# Patient Record
Sex: MALE | Race: WHITE | Employment: UNEMPLOYED | URBAN - METROPOLITAN AREA
[De-identification: names, ages, dates, MRNs, and addresses within clinical notes are randomized per-mention and may not be internally consistent; named-entity substitution may affect disease eponyms.]

---

## 2017-05-27 ENCOUNTER — APPOINTMENT (OUTPATIENT)
Dept: GENERAL RADIOLOGY | Age: 37
End: 2017-05-27
Attending: PHYSICIAN ASSISTANT
Payer: SELF-PAY

## 2017-05-27 ENCOUNTER — HOSPITAL ENCOUNTER (EMERGENCY)
Age: 37
Discharge: HOME | End: 2017-05-27
Attending: EMERGENCY MEDICINE
Payer: SELF-PAY

## 2017-05-27 VITALS
HEART RATE: 96 BPM | OXYGEN SATURATION: 100 % | BODY MASS INDEX: 33.6 KG/M2 | DIASTOLIC BLOOD PRESSURE: 110 MMHG | TEMPERATURE: 98.9 F | WEIGHT: 240 LBS | RESPIRATION RATE: 18 BRPM | SYSTOLIC BLOOD PRESSURE: 160 MMHG | HEIGHT: 71 IN

## 2017-05-27 DIAGNOSIS — S63.92XA HAND SPRAIN, LEFT, INITIAL ENCOUNTER: Primary | ICD-10-CM

## 2017-05-27 DIAGNOSIS — R03.0 ELEVATED BLOOD PRESSURE READING: ICD-10-CM

## 2017-05-27 PROCEDURE — 74011250636 HC RX REV CODE- 250/636: Performed by: PHYSICIAN ASSISTANT

## 2017-05-27 PROCEDURE — 73130 X-RAY EXAM OF HAND: CPT

## 2017-05-27 PROCEDURE — 99284 EMERGENCY DEPT VISIT MOD MDM: CPT

## 2017-05-27 PROCEDURE — 74011250637 HC RX REV CODE- 250/637: Performed by: EMERGENCY MEDICINE

## 2017-05-27 PROCEDURE — 96372 THER/PROPH/DIAG INJ SC/IM: CPT

## 2017-05-27 PROCEDURE — 73110 X-RAY EXAM OF WRIST: CPT

## 2017-05-27 RX ORDER — LISINOPRIL 5 MG/1
5 TABLET ORAL
Status: COMPLETED | OUTPATIENT
Start: 2017-05-27 | End: 2017-05-27

## 2017-05-27 RX ORDER — KETOROLAC TROMETHAMINE 30 MG/ML
60 INJECTION, SOLUTION INTRAMUSCULAR; INTRAVENOUS
Status: COMPLETED | OUTPATIENT
Start: 2017-05-27 | End: 2017-05-27

## 2017-05-27 RX ORDER — LISINOPRIL 5 MG/1
5 TABLET ORAL DAILY
Qty: 30 TAB | Refills: 0 | Status: SHIPPED | OUTPATIENT
Start: 2017-05-27 | End: 2017-07-09

## 2017-05-27 RX ORDER — NAPROXEN 500 MG/1
500 TABLET ORAL 2 TIMES DAILY WITH MEALS
Qty: 20 TAB | Refills: 0 | Status: SHIPPED | OUTPATIENT
Start: 2017-05-27 | End: 2017-06-06

## 2017-05-27 RX ORDER — LISINOPRIL 10 MG/1
5 TABLET ORAL
Status: DISCONTINUED | OUTPATIENT
Start: 2017-05-27 | End: 2017-05-27

## 2017-05-27 RX ADMIN — LISINOPRIL 5 MG: 5 TABLET ORAL at 09:20

## 2017-05-27 RX ADMIN — KETOROLAC TROMETHAMINE 60 MG: 30 INJECTION, SOLUTION INTRAMUSCULAR at 08:16

## 2017-05-27 NOTE — ED TRIAGE NOTES
Patient states he was changing his tire when the pratik slipped and hit his hand. Noticeable swelling to left hand.

## 2017-05-27 NOTE — DISCHARGE INSTRUCTIONS
Elevated Blood Pressure: Care Instructions  Your Care Instructions    Blood pressure is a measure of how hard the blood pushes against the walls of your arteries. It's normal for blood pressure to go up and down throughout the day. But if it stays up over time, you have high blood pressure. Two numbers tell you your blood pressure. The first number is the systolic pressure. It shows how hard the blood pushes when your heart is pumping. The second number is the diastolic pressure. It shows how hard the blood pushes between heartbeats, when your heart is relaxed and filling with blood. An ideal blood pressure in adults is less than 120/80 (say \"120 over 80\"). High blood pressure is 140/90 or higher. You have high blood pressure if your top number is 140 or higher or your bottom number is 90 or higher, or both. The main test for high blood pressure is simple, fast, and painless. To diagnose high blood pressure, your doctor will test your blood pressure at different times. After testing your blood pressure, your doctor may ask you to test it again when you are home. If you are diagnosed with high blood pressure, you can work with your doctor to make a long-term plan to manage it. Follow-up care is a key part of your treatment and safety. Be sure to make and go to all appointments, and call your doctor if you are having problems. It's also a good idea to know your test results and keep a list of the medicines you take. How can you care for yourself at home? · Do not smoke. Smoking increases your risk for heart attack and stroke. If you need help quitting, talk to your doctor about stop-smoking programs and medicines. These can increase your chances of quitting for good. · Stay at a healthy weight. · Try to limit how much sodium you eat to less than 2,300 milligrams (mg) a day. Your doctor may ask you to try to eat less than 1,500 mg a day. · Be physically active.  Get at least 30 minutes of exercise on most days of the week. Walking is a good choice. You also may want to do other activities, such as running, swimming, cycling, or playing tennis or team sports. · Avoid or limit alcohol. Talk to your doctor about whether you can drink any alcohol. · Eat plenty of fruits, vegetables, and low-fat dairy products. Eat less saturated and total fats. · Learn how to check your blood pressure at home. When should you call for help? Call your doctor now or seek immediate medical care if:  · Your blood pressure is much higher than normal (such as 180/110 or higher). · You think high blood pressure is causing symptoms such as:  ¨ Severe headache. ¨ Blurry vision. Watch closely for changes in your health, and be sure to contact your doctor if:  · You do not get better as expected. Where can you learn more? Go to http://annabellaCOLOURloverscuate.info/. Enter F306 in the search box to learn more about \"Elevated Blood Pressure: Care Instructions. \"  Current as of: October 19, 2016  Content Version: 11.2  © 8817-3255 [employer]. Care instructions adapted under license by [employer] (which disclaims liability or warranty for this information). If you have questions about a medical condition or this instruction, always ask your healthcare professional. [address] any warranty or liability for your use of this information. Hand Sprain: Care Instructions  Your Care Instructions  A hand sprain occurs when you stretch or tear a ligament in your hand. Ligaments are the tough tissues that connect one bone to another. Most hand sprains will heal with treatment you can do at home. Follow-up care is a key part of your treatment and safety. Be sure to make and go to all appointments, and call your doctor if you are having problems. It's also a good idea to know your test results and keep a list of the medicines you take. How can you care for yourself at home?   · If your doctor gave you a splint or immobilizer, wear it as directed. This will help keep swelling down and help your hand heal.  · Follow your doctor's directions for exercise and other activity. · For the first 2 days after your injury, avoid things that might increase swelling, such as hot showers, hot tubs, or hot packs. · Put ice or a cold pack on your hand for 10 to 20 minutes at a time to stop swelling. Try this every 1 to 2 hours for 3 days (when you are awake) or until the swelling goes down. Put a thin cloth between the ice pack and your skin. Keep your splint dry. · After 2 or 3 days, if your swelling is gone, put a heating pad (set on low) or a warm cloth on your hand. Some experts suggest that you go back and forth between hot and cold treatments. · Prop up your hand on a pillow when you ice it or anytime you sit or lie down. Try to keep it above the level of your heart. This will help reduce swelling. · Take pain medicines exactly as directed. ¨ If the doctor gave you a prescription medicine for pain, take it as prescribed. ¨ If you are not taking a prescription pain medicine, ask your doctor if you can take an over-the-counter medicine. · Return to your usual level of activity slowly. When should you call for help? Call your doctor now or seek immediate medical care if:  · Your pain is worse. · You have new or increased swelling in your hand. · You cannot move your hand. · You have tingling, weakness, or numbness in your hand or fingers. · Your hand or fingers are cool or pale or change color. · You have a fever. · Your hand or fingers are red. Watch closely for changes in your health, and be sure to contact your doctor if:  · Your hand does not get better as expected. Where can you learn more? Go to http://annabella-cuate.info/. Enter Y866 in the search box to learn more about \"Hand Sprain: Care Instructions. \"  Current as of: May 23, 2016  Content Version: 11.2  © 8063-1129 [employer]. Care instructions adapted under license by [employer] (which disclaims liability or warranty for this information). If you have questions about a medical condition or this instruction, always ask your healthcare professional. [place] [address] any warranty or liability for your use of this information.

## 2017-05-27 NOTE — ED PROVIDER NOTES
HPI Comments: Miguel Angel Gonzalez is a 43 y.o. Male smoker with a pertinent history of diverticulitis, HTN not currently on meds, HA, Allergic rhinitis, substance abuse, GERD, depression who presents to the emergency department for evaluation of left hand and wrist pain with swelling after an injury which occurred this morning. Pt states he was changing a tire on a pratik when the pratik popped out hit his hand. He took motrin pta. He states he is still able to move the fingers and hand, but is experiencing a burning and tearing sensation in his hand, particularly when moving the 2nd and 3rd fingers. Pt has not been on BP meds in a long time due to lack of PCP and insurance, but remembers having been on lisinopril 5mg. Pt denies any fevers or chills, headache, dizziness or light headedness, ENT issues, CP or discomfort, SOB, cough, n/v/d/c, abd pain, back pain, diaphoresis, melena/hematochezia, dysuria, hematuria, frequency, focal weakness/numbness/tingling, or rash. Patient has no other complaints at this time. PCP:  None        Patient is a 43 y.o. male presenting with hand pain, wrist pain, and hand swelling. Hand Pain    Pertinent negatives include no back pain. Wrist Pain    Pertinent negatives include no back pain. Hand Swelling    Pertinent negatives include no back pain.         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    Substance abuse     Opiate and ETOH Abuse    Tobacco dependence        Past Surgical History:   Procedure Laterality Date    HX HERNIA REPAIR      HX ORTHOPAEDIC      L wrist    HX ORTHOPAEDIC      pins in right hand         Family History:   Problem Relation Age of Onset    Adopted: Yes       Social History     Social History    Marital status: SINGLE     Spouse name: N/A    Number of children: N/A    Years of education: HS     Occupational History    Unemployed Not Employed     Social History Main Topics    Smoking status: Current Every Day Smoker     Packs/day: 1.50     Years: 20.00    Smokeless tobacco: Not on file    Alcohol use Yes      Comment: QD  HEAVY USE    Drug use: 5.00 per week     Special: Opiates, Marijuana    Sexual activity: Yes     Partners: Female     Birth control/ protection: None     Other Topics Concern    Not on file     Social History Narrative    Never . Lives in a camper on his parent's property. Unemployed. ALLERGIES: Phenergan [promethazine]; Risperidone; and Zyprexa [olanzapine]    Review of Systems   Constitutional: Negative for chills and fever. HENT: Negative for congestion, rhinorrhea and sore throat. Respiratory: Negative for cough and shortness of breath. Cardiovascular: Negative for chest pain. Gastrointestinal: Negative for abdominal pain, blood in stool, constipation, diarrhea, nausea and vomiting. Genitourinary: Negative for dysuria, frequency and hematuria. Musculoskeletal: Positive for arthralgias and joint swelling. Negative for back pain and myalgias. Skin: Negative for rash and wound. Neurological: Negative for dizziness and headaches. Vitals:    05/27/17 0733 05/27/17 0744 05/27/17 0747   BP: (!) 180/114 (!) 167/114    Pulse: 94 96    Resp: 16 18    Temp: 98.9 °F (37.2 °C) 98.7 °F (37.1 °C)    SpO2: 98% 100% 100%   Weight: 108.9 kg (240 lb)     Height: 5' 11\" (1.803 m)              Physical Exam   Constitutional: He is oriented to person, place, and time. He appears well-developed and well-nourished. No distress. HENT:   Head: Normocephalic and atraumatic. Eyes: Conjunctivae are normal.   Neck: Normal range of motion. Neck supple. Cardiovascular: Normal rate, regular rhythm and normal heart sounds. Pulmonary/Chest: Effort normal and breath sounds normal. No respiratory distress. He exhibits no tenderness. Abdominal: Soft. Bowel sounds are normal. He exhibits no distension. There is no tenderness. There is no rebound and no guarding. Musculoskeletal: He exhibits edema and tenderness. He exhibits no deformity. TTP noted to left dorsal hand with minimal erythema and edema. No obvious deformity, ecchymosis, or overlying skin changes noted on exam.  Pt is moving fingers of left hand with difficulty 2/2 to pain. Pt is neurovascularly intact distally with cap refill < 3 seconds and intact sensation. Neurological: He is alert and oriented to person, place, and time. Skin: Skin is warm and dry. He is not diaphoretic. Psychiatric: He has a normal mood and affect. Nursing note and vitals reviewed. MDM  Number of Diagnoses or Management Options  Elevated blood pressure reading: new and requires workup  Hand sprain, left, initial encounter: new and requires workup  Diagnosis management comments: Differential Diagnosis: fracture, dislocation, tendinous/ligamentous tear/strain, contusion, OA, RA, gout    Plan:  Pt presents ambulatory in , well-hydrated, non-toxic in appearance, with elevated BP and otherwise normal vitals. Exam reveals TTP with minimal edema and erythema to dorsal left hand. XR shows no acute process, pending radiology read. Will DC home with naprosyn, RICE, ace wrap. At this time, patient is stable and appropriate for discharge home. Patient demonstrates understanding of current diagnoses and is in agreement with the treatment plan. They are advised that while the likelihood of serious underlying condition is low at this point given the evaluation performed today, we cannot fully rule it out. They are advised to immediately return with any new symptoms or worsening of current condition. All questions have been answered. Patient is given educational material regarding their diagnoses, including danger symptoms and when to return to the ED.   Pt strongly urged to follow-up with Ortho. Amount and/or Complexity of Data Reviewed  Tests in the radiology section of CPT®: ordered and reviewed  Review and summarize past medical records: yes    Risk of Complications, Morbidity, and/or Mortality  Presenting problems: moderate  Diagnostic procedures: moderate  Management options: moderate    Patient Progress  Patient progress: improved    ED Course       Procedures      -------------------------------------------------------------------------------------------------------------------  Orders:  Orders Placed This Encounter    XR HAND LT MIN 3 V     Standing Status:   Standing     Number of Occurrences:   1     Order Specific Question:   Transport     Answer:   Stretcher [5]     Order Specific Question:   Reason for Exam     Answer:   Pain    XR WRIST LT AP/LAT/OBL MIN 3V     Standing Status:   Standing     Number of Occurrences:   1     Order Specific Question:   Transport     Answer:   Stretcher [5]     Order Specific Question:   Reason for Exam     Answer:   Pain    APPLY ACE WRAP:SPECIFY ONE TIME STAT     Standing Status:   Standing     Number of Occurrences:   1    ketorolac tromethamine (TORADOL) 60 mg/2 mL injection 60 mg    DISCONTD: lisinopril (PRINIVIL, ZESTRIL) tablet 5 mg    lisinopril (PRINIVIL, ZESTRIL) tablet 5 mg    naproxen (NAPROSYN) 500 mg tablet     Sig: Take 1 Tab by mouth two (2) times daily (with meals) for 10 days. Dispense:  20 Tab     Refill:  0    lisinopril (PRINIVIL, ZESTRIL) 5 mg tablet     Sig: Take 1 Tab by mouth daily.      Dispense:  30 Tab     Refill:  0      Radiology Results:  XR HAND LT MIN 3 V   Final Result      XR WRIST LT AP/LAT/OBL MIN 3V   Final Result          Progress Notes:  8:19 AM:  Marie Santoro PA-C was at the pt's bedside, assessed the pt and answered the pt's questions regarding treatment.    -------------------------------------------------------------------------------------------------------------------    Disposition:  Diagnosis:   1. Hand sprain, left, initial encounter    2. Elevated blood pressure reading        Disposition: [place] Home    Follow-up Information     Follow up With Details Comments Contact Info    Sloan Laboy MD Call in 2 days For follow-up [address]  [phone]      [facility] EMERGENCY DEPT Go to As needed, If symptoms worsen [address]  [phone]          Patient's Medications   Start Taking    LISINOPRIL (PRINIVIL, ZESTRIL) 5 MG TABLET    Take 1 Tab by mouth daily. NAPROXEN (NAPROSYN) 500 MG TABLET    Take 1 Tab by mouth two (2) times daily (with meals) for 10 days. Continue Taking    PREDNISONE (DELTASONE) 20 MG TABLET    3 tabs daily for 2 days; then 2 tabs daily for 2 days;    These Medications have changed    No medications on file   Stop Taking    No medications on file

## 2017-05-27 NOTE — ED TRIAGE NOTES
Pt, c/o pain /swelling left hand & wrist,  States car pratik flip  & hit his hand  While changing tire

## 2020-02-04 PROBLEM — E87.20 LACTIC ACIDOSIS: Status: ACTIVE | Noted: 2020-02-04

## 2020-02-04 PROBLEM — R00.0 TACHYCARDIA: Status: ACTIVE | Noted: 2020-02-04

## 2020-02-04 PROBLEM — F10.939 ALCOHOL WITHDRAWAL (HCC): Status: ACTIVE | Noted: 2020-02-04

## 2020-02-04 PROBLEM — R11.2 NAUSEA & VOMITING: Status: ACTIVE | Noted: 2020-02-04

## 2020-03-05 PROBLEM — E87.6 HYPOKALEMIA: Status: ACTIVE | Noted: 2020-03-05

## 2020-03-05 PROBLEM — K85.20 ACUTE ALCOHOLIC PANCREATITIS: Status: ACTIVE | Noted: 2020-03-05

## 2020-03-23 PROBLEM — Z95.5 S/P CORONARY ARTERY STENT PLACEMENT: Status: ACTIVE | Noted: 2020-03-20

## 2020-03-23 PROBLEM — Z95.5 S/P CORONARY ARTERY STENT PLACEMENT: Status: ACTIVE | Noted: 2020-03-23

## 2020-03-23 PROBLEM — R07.2 PRECORDIAL CHEST PAIN: Status: ACTIVE | Noted: 2020-03-23

## 2020-06-13 PROBLEM — K52.9 ENTERITIS: Status: ACTIVE | Noted: 2020-06-13

## 2020-07-14 ENCOUNTER — APPOINTMENT (OUTPATIENT)
Dept: GENERAL RADIOLOGY | Age: 40
End: 2020-07-14
Attending: EMERGENCY MEDICINE
Payer: MEDICAID

## 2020-07-14 ENCOUNTER — HOSPITAL ENCOUNTER (EMERGENCY)
Age: 40
Discharge: HOME | End: 2020-07-15
Attending: EMERGENCY MEDICINE
Payer: MEDICAID

## 2020-07-14 ENCOUNTER — HOSPITAL ENCOUNTER (EMERGENCY)
Age: 40
Discharge: HOME | End: 2020-07-14
Attending: EMERGENCY MEDICINE

## 2020-07-14 VITALS
OXYGEN SATURATION: 97 % | RESPIRATION RATE: 16 BRPM | SYSTOLIC BLOOD PRESSURE: 129 MMHG | DIASTOLIC BLOOD PRESSURE: 83 MMHG | BODY MASS INDEX: 25.9 KG/M2 | HEIGHT: 71 IN | HEART RATE: 101 BPM | TEMPERATURE: 98.4 F | WEIGHT: 185 LBS

## 2020-07-14 VITALS
DIASTOLIC BLOOD PRESSURE: 60 MMHG | HEART RATE: 81 BPM | TEMPERATURE: 97.5 F | RESPIRATION RATE: 16 BRPM | OXYGEN SATURATION: 94 % | SYSTOLIC BLOOD PRESSURE: 104 MMHG

## 2020-07-14 DIAGNOSIS — F10.930 ALCOHOL WITHDRAWAL SYNDROME WITHOUT COMPLICATION (HCC): Primary | ICD-10-CM

## 2020-07-14 DIAGNOSIS — K57.92 DIVERTICULITIS: ICD-10-CM

## 2020-07-14 DIAGNOSIS — R10.13 ABDOMINAL PAIN, EPIGASTRIC: ICD-10-CM

## 2020-07-14 DIAGNOSIS — K86.0 ALCOHOL-INDUCED CHRONIC PANCREATITIS (HCC): ICD-10-CM

## 2020-07-14 DIAGNOSIS — Z78.9 ALCOHOL USE: Primary | ICD-10-CM

## 2020-07-14 LAB
ALBUMIN SERPL-MCNC: 4.5 G/DL (ref 3.4–5)
ALBUMIN/GLOB SERPL: 1.3 {RATIO} (ref 0.8–1.7)
ALP SERPL-CCNC: 196 U/L (ref 45–117)
ALT SERPL-CCNC: 107 U/L (ref 16–61)
AMPHET UR QL SCN: NEGATIVE
ANION GAP SERPL CALC-SCNC: 12 MMOL/L (ref 3–18)
APPEARANCE UR: CLEAR
AST SERPL-CCNC: 73 U/L (ref 10–38)
ATRIAL RATE: 106 BPM
BARBITURATES UR QL SCN: POSITIVE
BASOPHILS # BLD: 0 K/UL (ref 0–0.1)
BASOPHILS NFR BLD: 0 % (ref 0–2)
BENZODIAZ UR QL: POSITIVE
BILIRUB SERPL-MCNC: 0.4 MG/DL (ref 0.2–1)
BILIRUB UR QL: NEGATIVE
BUN SERPL-MCNC: 6 MG/DL (ref 7–18)
BUN/CREAT SERPL: 9 (ref 12–20)
CALCIUM SERPL-MCNC: 8.8 MG/DL (ref 8.5–10.1)
CALCULATED P AXIS, ECG09: 49 DEGREES
CALCULATED R AXIS, ECG10: 8 DEGREES
CALCULATED T AXIS, ECG11: 52 DEGREES
CANNABINOIDS UR QL SCN: NEGATIVE
CHLORIDE SERPL-SCNC: 108 MMOL/L (ref 100–111)
CO2 SERPL-SCNC: 22 MMOL/L (ref 21–32)
COCAINE UR QL SCN: NEGATIVE
COLOR UR: YELLOW
COVID-19 RAPID TEST, COVR: NOT DETECTED
CREAT SERPL-MCNC: 0.64 MG/DL (ref 0.6–1.3)
DIAGNOSIS, 93000: NORMAL
DIFFERENTIAL METHOD BLD: ABNORMAL
EOSINOPHIL # BLD: 0.2 K/UL (ref 0–0.4)
EOSINOPHIL NFR BLD: 1 % (ref 0–5)
ERYTHROCYTE [DISTWIDTH] IN BLOOD BY AUTOMATED COUNT: 13.4 % (ref 11.6–14.5)
ETHANOL SERPL-MCNC: 108 MG/DL (ref 0–3)
GLOBULIN SER CALC-MCNC: 3.6 G/DL (ref 2–4)
GLUCOSE SERPL-MCNC: 66 MG/DL (ref 74–99)
GLUCOSE UR STRIP.AUTO-MCNC: NEGATIVE MG/DL
HCT VFR BLD AUTO: 49.2 % (ref 36–48)
HDSCOM,HDSCOM: ABNORMAL
HGB BLD-MCNC: 17.1 G/DL (ref 13–16)
HGB UR QL STRIP: NEGATIVE
KETONES UR QL STRIP.AUTO: ABNORMAL MG/DL
LEUKOCYTE ESTERASE UR QL STRIP.AUTO: NEGATIVE
LIPASE SERPL-CCNC: 73 U/L (ref 73–393)
LYMPHOCYTES # BLD: 2.7 K/UL (ref 0.9–3.6)
LYMPHOCYTES NFR BLD: 17 % (ref 21–52)
MAGNESIUM SERPL-MCNC: 1.9 MG/DL (ref 1.6–2.6)
MCH RBC QN AUTO: 29.5 PG (ref 24–34)
MCHC RBC AUTO-ENTMCNC: 34.8 G/DL (ref 31–37)
MCV RBC AUTO: 85 FL (ref 74–97)
METHADONE UR QL: NEGATIVE
MONOCYTES # BLD: 0.9 K/UL (ref 0.05–1.2)
MONOCYTES NFR BLD: 5 % (ref 3–10)
NEUTS SEG # BLD: 12.3 K/UL (ref 1.8–8)
NEUTS SEG NFR BLD: 77 % (ref 40–73)
NITRITE UR QL STRIP.AUTO: NEGATIVE
OPIATES UR QL: NEGATIVE
P-R INTERVAL, ECG05: 164 MS
PCP UR QL: NEGATIVE
PH UR STRIP: 5 [PH] (ref 5–8)
PLATELET # BLD AUTO: 274 K/UL (ref 135–420)
PMV BLD AUTO: 10.1 FL (ref 9.2–11.8)
POTASSIUM SERPL-SCNC: 3.7 MMOL/L (ref 3.5–5.5)
PROT SERPL-MCNC: 8.1 G/DL (ref 6.4–8.2)
PROT UR STRIP-MCNC: NEGATIVE MG/DL
Q-T INTERVAL, ECG07: 340 MS
QRS DURATION, ECG06: 86 MS
QTC CALCULATION (BEZET), ECG08: 451 MS
RBC # BLD AUTO: 5.79 M/UL (ref 4.7–5.5)
SODIUM SERPL-SCNC: 142 MMOL/L (ref 136–145)
SOURCE, COVRS: NORMAL
SP GR UR REFRACTOMETRY: 1.01 (ref 1–1.03)
UROBILINOGEN UR QL STRIP.AUTO: 0.2 EU/DL (ref 0.2–1)
VENTRICULAR RATE, ECG03: 106 BPM
WBC # BLD AUTO: 16.1 K/UL (ref 4.6–13.2)

## 2020-07-14 PROCEDURE — 74018 RADEX ABDOMEN 1 VIEW: CPT

## 2020-07-14 PROCEDURE — 74011250636 HC RX REV CODE- 250/636: Performed by: EMERGENCY MEDICINE

## 2020-07-14 PROCEDURE — 81003 URINALYSIS AUTO W/O SCOPE: CPT

## 2020-07-14 PROCEDURE — 83735 ASSAY OF MAGNESIUM: CPT

## 2020-07-14 PROCEDURE — 87635 SARS-COV-2 COVID-19 AMP PRB: CPT

## 2020-07-14 PROCEDURE — 71045 X-RAY EXAM CHEST 1 VIEW: CPT

## 2020-07-14 PROCEDURE — 93005 ELECTROCARDIOGRAM TRACING: CPT

## 2020-07-14 PROCEDURE — 99285 EMERGENCY DEPT VISIT HI MDM: CPT

## 2020-07-14 PROCEDURE — 96375 TX/PRO/DX INJ NEW DRUG ADDON: CPT

## 2020-07-14 PROCEDURE — 96374 THER/PROPH/DIAG INJ IV PUSH: CPT

## 2020-07-14 PROCEDURE — 74011250637 HC RX REV CODE- 250/637: Performed by: EMERGENCY MEDICINE

## 2020-07-14 PROCEDURE — 74011000250 HC RX REV CODE- 250: Performed by: EMERGENCY MEDICINE

## 2020-07-14 PROCEDURE — 83690 ASSAY OF LIPASE: CPT

## 2020-07-14 PROCEDURE — 99284 EMERGENCY DEPT VISIT MOD MDM: CPT

## 2020-07-14 PROCEDURE — 80053 COMPREHEN METABOLIC PANEL: CPT

## 2020-07-14 PROCEDURE — 96376 TX/PRO/DX INJ SAME DRUG ADON: CPT

## 2020-07-14 PROCEDURE — 85025 COMPLETE CBC W/AUTO DIFF WBC: CPT

## 2020-07-14 PROCEDURE — 80307 DRUG TEST PRSMV CHEM ANLYZR: CPT

## 2020-07-14 RX ORDER — SODIUM CHLORIDE 0.9 % (FLUSH) 0.9 %
5-40 SYRINGE (ML) INJECTION EVERY 8 HOURS
Status: DISCONTINUED | OUTPATIENT
Start: 2020-07-14 | End: 2020-07-15 | Stop reason: HOSPADM

## 2020-07-14 RX ORDER — LORAZEPAM 2 MG/ML
2 INJECTION INTRAMUSCULAR
Status: DISCONTINUED | OUTPATIENT
Start: 2020-07-14 | End: 2020-07-15 | Stop reason: HOSPADM

## 2020-07-14 RX ORDER — LORAZEPAM 2 MG/ML
1 INJECTION INTRAMUSCULAR ONCE
Status: COMPLETED | OUTPATIENT
Start: 2020-07-14 | End: 2020-07-14

## 2020-07-14 RX ORDER — LORAZEPAM 1 MG/1
1 TABLET ORAL
Status: DISCONTINUED | OUTPATIENT
Start: 2020-07-14 | End: 2020-07-15 | Stop reason: HOSPADM

## 2020-07-14 RX ORDER — LORAZEPAM 2 MG/ML
3 INJECTION INTRAMUSCULAR
Status: DISCONTINUED | OUTPATIENT
Start: 2020-07-14 | End: 2020-07-15 | Stop reason: HOSPADM

## 2020-07-14 RX ORDER — SODIUM CHLORIDE 0.9 % (FLUSH) 0.9 %
5-40 SYRINGE (ML) INJECTION AS NEEDED
Status: DISCONTINUED | OUTPATIENT
Start: 2020-07-14 | End: 2020-07-15 | Stop reason: HOSPADM

## 2020-07-14 RX ORDER — LORAZEPAM 2 MG/ML
1 INJECTION INTRAMUSCULAR
Status: DISCONTINUED | OUTPATIENT
Start: 2020-07-14 | End: 2020-07-15 | Stop reason: HOSPADM

## 2020-07-14 RX ORDER — LORAZEPAM 1 MG/1
2 TABLET ORAL
Status: DISCONTINUED | OUTPATIENT
Start: 2020-07-14 | End: 2020-07-15 | Stop reason: HOSPADM

## 2020-07-14 RX ORDER — ACETAMINOPHEN 500 MG
1000 TABLET ORAL
Status: COMPLETED | OUTPATIENT
Start: 2020-07-14 | End: 2020-07-14

## 2020-07-14 RX ORDER — ACETAMINOPHEN 500 MG
1000 TABLET ORAL
Status: DISCONTINUED | OUTPATIENT
Start: 2020-07-14 | End: 2020-07-14 | Stop reason: HOSPADM

## 2020-07-14 RX ADMIN — Medication 10 ML: at 23:31

## 2020-07-14 RX ADMIN — ACETAMINOPHEN 1000 MG: 500 TABLET, FILM COATED ORAL at 15:31

## 2020-07-14 RX ADMIN — LORAZEPAM 1 MG: 2 INJECTION INTRAMUSCULAR; INTRAVENOUS at 15:31

## 2020-07-14 RX ADMIN — LORAZEPAM 2 MG: 2 INJECTION INTRAMUSCULAR; INTRAVENOUS at 23:29

## 2020-07-14 RX ADMIN — LORAZEPAM 2 MG: 2 INJECTION INTRAMUSCULAR; INTRAVENOUS at 19:55

## 2020-07-14 RX ADMIN — FOLIC ACID: 5 INJECTION, SOLUTION INTRAMUSCULAR; INTRAVENOUS; SUBCUTANEOUS at 11:00

## 2020-07-14 RX ADMIN — LORAZEPAM 1 MG: 2 INJECTION INTRAMUSCULAR; INTRAVENOUS at 11:05

## 2020-07-14 NOTE — ED PROVIDER NOTES
EMERGENCY DEPARTMENT HISTORY AND PHYSICAL EXAM    11:25 AM      Date: 7/14/2020  Patient Name: Myra Ford    History of Presenting Illness     Chief Complaint   Patient presents with    Pancreatitis         History Provided By: Patient  Location/Duration/Severity/Modifying factors   Patient is a 55-year-old male with a history of alcoholism, pancreatitis, recent colitis with sepsis admission, liver disease, homelessness, the presents emergency department complaint of increasing abdominal pain, shaking, and not being able to eat or drink. Patient has been homeless for 1 week after being kicked out of his sister's home. Patient has struggled keeping a job due to his alcoholism. Patient notes for the last 2 days he has been binge drinking and drinks over a liter of whiskey a day with last drink at 3 AM this morning. Patient notes that since he has been been drinking the last 2 days he started have epigastric pain and difficult time tolerating anything by mouth. Patient says when he has these episodes that he usually feels better with Ativan and pain control. Patient denies any diarrhea, constipation, fevers, chills, or shortness of breath. Patient has not been able to stop drinking for a significant period of time and struggles with his addiction. Patient admits to smoking cigarettes but denies any other drug use. Patient denies any other aggravating alleviating factors. Patient rates his pain as moderate epigastric without radiation. PCP: None    Current Facility-Administered Medications   Medication Dose Route Frequency Provider Last Rate Last Dose    0.9% sodium chloride 1,000 mL with mvi, adult no. 4 with vit K 10 mL, thiamine 337 mg, folic acid 1 mg infusion   IntraVENous ONCE Salomonsky, Meryle Andrews, MD         Current Outpatient Medications   Medication Sig Dispense Refill    ondansetron hcl (Zofran) 4 mg tablet Take 1 Tab by mouth every eight (8) hours as needed for Nausea or Vomiting. 12 Tab 0    ondansetron hcl (Zofran) 4 mg tablet Take 1 Tab by mouth every six (6) hours as needed for Nausea. 15 Tab 0    chlordiazePOXIDE (LIBRIUM) 25 mg capsule Take 1 Cap by mouth three (3) times daily. Max Daily Amount: 75 mg. Tid 2 days  Bid for 2 days  Daily for 2 days 12 Cap 0    ergocalciferol (ERGOCALCIFEROL) 1,250 mcg (50,000 unit) capsule Take 1 Cap by mouth every seven (7) days. 4 Cap 5    nicotine (NICODERM CQ) 21 mg/24 hr 1 Patch by TransDERmal route daily for 30 days. 30 Patch 0    losartan-hydroCHLOROthiazide (Hyzaar) 100-25 mg per tablet Take 1 Tab by mouth daily. 30 Tab 5    metoprolol succinate (TOPROL-XL) 50 mg XL tablet Take 1 Tab by mouth daily. 30 Tab 3    lurasidone (Latuda) 40 mg tab tablet Take 40 mg by mouth daily (with dinner).  pantoprazole (PROTONIX) 40 mg tablet Take 40 mg by mouth daily.  PHENobarbitaL (LUMINAL) 32.4 mg tablet Take 32.4 mg by mouth two (2) times a day.  atorvastatin (LIPITOR) 20 mg tablet Take 20 mg by mouth daily.  Omeprazole delayed release (PRILOSEC D/R) 20 mg tablet Take 20 mg by mouth two (2) times a day.  sertraline (ZOLOFT) 25 mg tablet Take 25 mg by mouth daily.  sucralfate (CARAFATE) 1 gram tablet Take 1 g by mouth four (4) times daily.  prasugreL (EFFIENT) 10 mg tablet Take 10 mg by mouth daily.  EC ASPIRIN PO Take 81 mg by mouth daily.          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    Nausea & vomiting 2/4/2020    Substance abuse ([facility] [address]     Opiate and ETOH Abuse    Tobacco dependence        Past Surgical History:  Past Surgical History:   Procedure Laterality Date    HX CORONARY STENT PLACEMENT  03/19/2020    MLAD  HX HERNIA REPAIR      HX ORTHOPAEDIC      L wrist    HX ORTHOPAEDIC      pins in right hand       Family History:  Family History   Adopted: Yes   Family history unknown: Yes       Social History:  Social History     Tobacco Use    Smoking status: Current Every Day Smoker     Packs/day: 1.00     Years: 20.00     Pack years: 20.00    Smokeless tobacco: Never Used   Substance Use Topics    Alcohol use: Yes     Comment: 1L of pratik /day    Drug use: Not Currently     Frequency: 5.0 times per week     Types: Marijuana     Comment: occasional       Allergies: Allergies   Allergen Reactions    Phenergan [Promethazine] Other (comments)     Agitation    Risperidone Other (comments)     seizures    Zyprexa [Olanzapine] Other (comments)     Headaches         Review of Systems       Review of Systems   Constitutional: Negative for activity change, fatigue and fever. HENT: Negative for congestion and rhinorrhea. Eyes: Negative for visual disturbance. Respiratory: Negative for shortness of breath. Cardiovascular: Negative for chest pain and palpitations. Gastrointestinal: Positive for abdominal pain and nausea. Negative for diarrhea and vomiting. Genitourinary: Negative for dysuria and hematuria. Musculoskeletal: Negative for back pain. Skin: Negative for rash. Neurological: Positive for tremors. Negative for dizziness, weakness and light-headedness. Psychiatric/Behavioral: The patient is nervous/anxious. All other systems reviewed and are negative. Physical Exam     Visit Vitals  /55   Pulse (!) 101   Temp 97.5 °F (36.4 °C)   Resp 21   SpO2 96%         Physical Exam  Vitals signs and nursing note reviewed. Constitutional:       General: He is not in acute distress. Appearance: He is well-developed. HENT:      Head: Normocephalic and atraumatic.       Right Ear: External ear normal.      Left Ear: External ear normal.      Nose: Nose normal.   Eyes:      General: No scleral icterus. Conjunctiva/sclera: Conjunctivae normal.      Pupils: Pupils are equal, round, and reactive to light. Neck:      Musculoskeletal: Normal range of motion and neck supple. Thyroid: No thyromegaly. Vascular: No JVD. Trachea: No tracheal deviation. Cardiovascular:      Rate and Rhythm: Regular rhythm. Tachycardia present. Heart sounds: Normal heart sounds. No murmur. No friction rub. No gallop. Pulmonary:      Effort: Pulmonary effort is normal.      Breath sounds: Normal breath sounds. Chest:      Chest wall: No tenderness. Abdominal:      General: Bowel sounds are normal. There is no distension. Tenderness: There is abdominal tenderness. There is no guarding or rebound. Musculoskeletal: Normal range of motion. General: No tenderness. Lymphadenopathy:      Cervical: No cervical adenopathy. Skin:     General: Skin is warm and dry. Neurological:      Mental Status: He is alert and oriented to person, place, and time. Cranial Nerves: No cranial nerve deficit. Coordination: Coordination normal.      Comments: Tremulous, follows commands, gait not observed   Psychiatric:         Behavior: Behavior normal.         Thought Content:  Thought content normal.         Judgment: Judgment normal.      Comments: Mildly anxious, denies suicidal homicidal ideation           Diagnostic Study Results     Labs -  Recent Results (from the past 12 hour(s))   URINALYSIS W/ RFLX MICROSCOPIC    Collection Time: 07/14/20 10:12 AM   Result Value Ref Range    Color YELLOW      Appearance CLEAR      Specific gravity 1.011 1.005 - 1.030      pH (UA) 5.0 5.0 - 8.0      Protein Negative NEG mg/dL    Glucose Negative NEG mg/dL    Ketone TRACE (A) NEG mg/dL    Bilirubin Negative NEG      Blood Negative NEG      Urobilinogen 0.2 0.2 - 1.0 EU/dL    Nitrites Negative NEG      Leukocyte Esterase Negative NEG     DRUG SCREEN, URINE    Collection Time: 07/14/20 10:12 AM Result Value Ref Range    BENZODIAZEPINES Positive (A) NEG      BARBITURATES Positive (A) NEG      THC (TH-CANNABINOL) Negative NEG      OPIATES Negative NEG      PCP(PHENCYCLIDINE) Negative NEG      COCAINE Negative NEG      AMPHETAMINES Negative NEG      METHADONE Negative NEG      HDSCOM (NOTE)    CBC WITH AUTOMATED DIFF    Collection Time: 07/14/20 10:13 AM   Result Value Ref Range    WBC 16.1 (H) 4.6 - 13.2 K/uL    RBC 5.79 (H) 4.70 - 5.50 M/uL    HGB 17.1 (H) 13.0 - 16.0 g/dL    HCT 49.2 (H) 36.0 - 48.0 %    MCV 85.0 74.0 - 97.0 FL    MCH 29.5 24.0 - 34.0 PG    MCHC 34.8 31.0 - 37.0 g/dL    RDW 13.4 11.6 - 14.5 %    PLATELET 314 257 - 969 K/uL    MPV 10.1 9.2 - 11.8 FL    NEUTROPHILS 77 (H) 40 - 73 %    LYMPHOCYTES 17 (L) 21 - 52 %    MONOCYTES 5 3 - 10 %    EOSINOPHILS 1 0 - 5 %    BASOPHILS 0 0 - 2 %    ABS. NEUTROPHILS 12.3 (H) 1.8 - 8.0 K/UL    ABS. LYMPHOCYTES 2.7 0.9 - 3.6 K/UL    ABS. MONOCYTES 0.9 0.05 - 1.2 K/UL    ABS. EOSINOPHILS 0.2 0.0 - 0.4 K/UL    ABS. BASOPHILS 0.0 0.0 - 0.1 K/UL    DF AUTOMATED     METABOLIC PANEL, COMPREHENSIVE    Collection Time: 07/14/20 10:13 AM   Result Value Ref Range    Sodium 142 136 - 145 mmol/L    Potassium 3.7 3.5 - 5.5 mmol/L    Chloride 108 100 - 111 mmol/L    CO2 22 21 - 32 mmol/L    Anion gap 12 3.0 - 18 mmol/L    Glucose 66 (L) 74 - 99 mg/dL    BUN 6 (L) 7.0 - 18 MG/DL    Creatinine 0.64 0.6 - 1.3 MG/DL    BUN/Creatinine ratio 9 (L) 12 - 20      GFR est AA >60 >60 ml/min/1.73m2    GFR est non-AA >60 >60 ml/min/1.73m2    Calcium 8.8 8.5 - 10.1 MG/DL    Bilirubin, total 0.4 0.2 - 1.0 MG/DL    ALT (SGPT) 107 (H) 16 - 61 U/L    AST (SGOT) 73 (H) 10 - 38 U/L    Alk.  phosphatase 196 (H) 45 - 117 U/L    Protein, total 8.1 6.4 - 8.2 g/dL    Albumin 4.5 3.4 - 5.0 g/dL    Globulin 3.6 2.0 - 4.0 g/dL    A-G Ratio 1.3 0.8 - 1.7     LIPASE    Collection Time: 07/14/20 10:13 AM   Result Value Ref Range    Lipase 73 73 - 393 U/L   ETHYL ALCOHOL    Collection Time: 07/14/20 10:13 AM   Result Value Ref Range    ALCOHOL(ETHYL),SERUM 108 (H) 0 - 3 MG/DL   MAGNESIUM    Collection Time: 07/14/20 10:13 AM   Result Value Ref Range    Magnesium 1.9 1.6 - 2.6 mg/dL   EKG, 12 LEAD, INITIAL    Collection Time: 07/14/20 10:21 AM   Result Value Ref Range    Ventricular Rate 106 BPM    Atrial Rate 106 BPM    P-R Interval 164 ms    QRS Duration 86 ms    Q-T Interval 340 ms    QTC Calculation (Bezet) 451 ms    Calculated P Axis 49 degrees    Calculated R Axis 8 degrees    Calculated T Axis 52 degrees    Diagnosis       Sinus tachycardia  Possible Inferior infarct , age undetermined  Abnormal ECG  When compared with ECG of 10-APR-2009 22:43,  Borderline criteria for Inferior infarct are now present  Confirmed by Rhae Staff, MD, Harpreet Parra ([id number]) on 7/14/2020 11:13:10 AM         Radiologic Studies -   XR ABD (KUB)   Final Result   IMPRESSION: No acute findings. XR CHEST SNGL V   Final Result   IMPRESSION: No active cardiopulmonary disease. Medical Decision Making   I am the first provider for this patient. I reviewed the vital signs, available nursing notes, past medical history, past surgical history, family history and social history. Vital Signs-Reviewed the patient's vital signs. Records Reviewed: Nursing Notes and Old Medical Records (Time of Review: 11:25 AM)    ED Course: Progress Notes, Reevaluation, and Consults:     Patient sleeping comfortably, abdomen is soft, and is saying that he would like to consider detoxing from alcohol. The patient was given Flagyl on discharge from [facility] however he has not taken it. Given that he had colitis on his last CT and is not having any diarrhea suspect this pain is chronic pancreatitis and will hold off on imaging the patient further at this time and discussed the case with crisis regarding possible detox admission. Shannon Powell DO 2:19 PM    Patient is eating Peter butter and jelly sandwiches without any issues and abdomen remained soft. The patient is medically cleared and [facility] from crisis is evaluating him now. Meryl Rush, DO 7:07 PM    Will sign out to Dr. Conrad downs on the evening team to follow the disposition. [facility] from crisis will plan to admit and would like a rapid COVID test.Murali Frias, DO 7:22 PM        Provider Notes (Medical Decision Making):   MDM  Number of Diagnoses or Management Options  Diagnosis management comments: Patient is a 47-year-old male with a history of alcoholism, smoking, recent admission for sepsis and colitis, recurrent pancreatitis, and homelessness the presents emergency department with epigastric pain and tremor. Patient says pain control and Ativan seem to help him when his happens and was at the [facility] emergency department overnight however did not complete his care. Patient admits to having alcohol at 3 AM last night and since has been having increasing pain. We will follow his abdominal labs, hydrate, supportive care with Ativan for his withdrawal, CIWA protocol, and then reevaluate the need for further imaging. Meryl Rush, DO 11:30 AM        Procedures    Critical Care Time:       Diagnosis     Clinical Impression: No diagnosis found. Disposition:     Follow-up Information    None          Patient's Medications   Start Taking    No medications on file   Continue Taking    ATORVASTATIN (LIPITOR) 20 MG TABLET    Take 20 mg by mouth daily. CHLORDIAZEPOXIDE (LIBRIUM) 25 MG CAPSULE    Take 1 Cap by mouth three (3) times daily. Max Daily Amount: 75 mg. Tid 2 days  Bid for 2 days  Daily for 2 days    EC ASPIRIN PO    Take 81 mg by mouth daily. ERGOCALCIFEROL (ERGOCALCIFEROL) 1,250 MCG (50,000 UNIT) CAPSULE    Take 1 Cap by mouth every seven (7) days. LOSARTAN-HYDROCHLOROTHIAZIDE (HYZAAR) 100-25 MG PER TABLET    Take 1 Tab by mouth daily. LURASIDONE (LATUDA) 40 MG TAB TABLET    Take 40 mg by mouth daily (with dinner).     METOPROLOL SUCCINATE (TOPROL-XL) 50 MG XL TABLET    Take 1 Tab by mouth daily. NICOTINE (NICODERM CQ) 21 MG/24 HR    1 Patch by TransDERmal route daily for 30 days. OMEPRAZOLE DELAYED RELEASE (PRILOSEC D/R) 20 MG TABLET    Take 20 mg by mouth two (2) times a day. ONDANSETRON HCL (ZOFRAN) 4 MG TABLET    Take 1 Tab by mouth every six (6) hours as needed for Nausea. ONDANSETRON HCL (ZOFRAN) 4 MG TABLET    Take 1 Tab by mouth every eight (8) hours as needed for Nausea or Vomiting. PANTOPRAZOLE (PROTONIX) 40 MG TABLET    Take 40 mg by mouth daily. PHENOBARBITAL (LUMINAL) 32.4 MG TABLET    Take 32.4 mg by mouth two (2) times a day. PRASUGREL (EFFIENT) 10 MG TABLET    Take 10 mg by mouth daily. SERTRALINE (ZOLOFT) 25 MG TABLET    Take 25 mg by mouth daily. SUCRALFATE (CARAFATE) 1 GRAM TABLET    Take 1 g by mouth four (4) times daily. These Medications have changed    No medications on file   Stop Taking    No medications on file     Disclaimer: Sections of this note are dictated using utilizing voice recognition software. Minor typographical errors may be present. If questions arise, please do not hesitate to contact me or call our department.

## 2020-07-14 NOTE — BSMART NOTE
Comprehensive Assessment     Integrated Summary    Patient is a 37year old male who presented to the emergency room with c/o \"want alcohol detox. I been drinking way too much alcohol. I've been drinking everything. ..as much as I could get. I've been drinking beer and liquor for 2 days. I got Bipolar Disorder, too. \" Alcohol withdrawal symptoms include shakes, agitation, and seizures. Patient denied thoughts of harm towards self or others. I just want detox from alcohol; discussed with patient treatment options, and patient is voluntary for treatment at a facility with the [employer]; however, there are no beds at [facility] or [facility]; however, clinicals submitted to [facility] for review, and possible bed availability on tomorrow. The patient's appearance is unkempt. The patient's behavior shows no evidence of impairment. The patient is oriented to time, place, person and situation. The patient's speech shows no evidence of impairment. The patient's mood is depressed. The range of affect shows no evidence of impairment. The patient's thought content demonstrates no evidence of impairment. The thought process shows no evidence of impairment. The patient's perception demonstrated changes in the following: auditory and visual, \" see people, and hear thing, denied command hallucinations. The patient's memory shows no evidence of impairment. The patient's appetite shows no evidence of impairment. The patient's sleep has evidence of insomnia, \"up drinking. \"     Access to weapons: Denied  Legal Issues: \"Probation for check fraud\"  Outpatient Care: \"[facility], last appointment 1 month ago\"  Inpatient Services: \"[facility], [facility], \"  Contact/Support Person: Randa Monterroso, sister, [phone]\"    Disposition    Discussed with Dr. Joel Canales and patient, the plan is admit; awaiting possible bed at [facility] in the am.    Anca Silverman, RN, BSN

## 2020-07-14 NOTE — ED NOTES
Patient stated he hasn't eaten in 2 days, requested food, 2 PB & J sandwiches, jello and applesauce provided.

## 2020-07-14 NOTE — ED PROVIDER NOTES
EMERGENCY DEPARTMENT HISTORY AND PHYSICAL EXAM      Date: 7/14/2020  Patient Name: Todd Velarde    History of Presenting Illness     Chief Complaint   Patient presents with    Abdominal Pain    Alcohol intoxication       History (Context): Todd Velarde is a 37 y.o. gentleman with severe alcoholism, recent acute on chronic pancreatitis, recent diverticulitis, who was discharged from the hospital yesterday with a prescription of phenobarbital for alcohol cessation, who presents today with subacute on chronic, persistent, moderate need for alcohol withdrawal treatment. Patient drink 2 L of bourbon today. On review of systems, the patient denies fever, chills, nausea, vomiting, diarrhea, back pain, chest pain, shortness of breath, diaphoresis, rashes, tick bite, recent travel.     PCP: None    Facility-Administered Medications Ordered in Other Encounters   Medication Dose Route Frequency Provider Last Rate Last Dose    nicotine (NICODERM CQ) 21 mg/24 hr patch 1 Patch  1 Patch TransDERmal QHS Lisa Gupta MD   1 Patch at 07/23/20 2007    LORazepam (ATIVAN) tablet 1 mg  1 mg Oral Q1H PRN Izzy Hernandez PA   1 mg at 07/23/20 2008    Or    LORazepam (ATIVAN) injection 1 mg  1 mg IntraVENous Q1H PRN Izzy Hernandez PA   1 mg at 07/21/20 0826    LORazepam (ATIVAN) tablet 2 mg  2 mg Oral Q1H PRN Izzy Hernandez PA   2 mg at 07/22/20 1823    Or    LORazepam (ATIVAN) injection 2 mg  2 mg IntraVENous Q1H PRN Izyz Hernandez PA   2 mg at 07/22/20 0156    LORazepam (ATIVAN) injection 3 mg  3 mg IntraVENous Q15MIN PRN Izzy Hernandez PA        atorvastatin (LIPITOR) tablet 20 mg  20 mg Oral QHS Lisa Gupta MD   20 mg at 07/23/20 2007    citalopram (CELEXA) tablet 20 mg  20 mg Oral DAILY Lisa Gupta MD   20 mg at 07/23/20 0827    lurasidone (LATUDA) tablet 40 mg  40 mg Oral DAILY WITH Isabela Leroy, Shena Higgins MD   40 mg at 07/23/20 1748    metoprolol succinate (TOPROL-XL) XL tablet 25 mg  25 mg Oral DAILY Luz Marina Choudhury, Shena Higgins MD   25 mg at 07/23/20 0827    pantoprazole (PROTONIX) tablet 40 mg  40 mg Oral ACB&D Lisa Gupta MD   40 mg at 07/23/20 1636    sertraline (ZOLOFT) tablet 25 mg  25 mg Oral DAILY Lisa Gupta MD   25 mg at 07/23/20 0827    sucralfate (CARAFATE) tablet 1 g  1 g Oral QID Lisa Gupta MD   1 g at 07/23/20 2009    traZODone (DESYREL) tablet 100 mg  100 mg Oral QHS Lisa Gupta MD   100 mg at 07/23/20 2008    [facility]) injection 0.1 mg  0.1 mg IntraVENous PRN Lisa Gupta MD        acetaminophen (TYLENOL) tablet 650 mg  650 mg Oral Q4H PRN Lisa Gupta MD        Or   Aburto acetaminophen (TYLENOL) solution 650 mg  650 mg Oral Q4H PRN Lisa Gupta MD        Or   Aburto acetaminophen (TYLENOL) suppository 650 mg  650 mg Rectal Q4H PRN Lisa Gupta MD        ondansetron [facility]) injection 4 mg  4 mg IntraVENous Q4H PRN Lisa Gupta MD        0.9% sodium chloride infusion  100 mL/hr IntraVENous CONTINUOUS Lisa Gupta  mL/hr at 07/23/20 2103 100 mL/hr at 07/23/20 2103    losartan-hydroCHLOROthiazide (HYZAAR) 50-12.5 mg per tablet 1 Tab  1 Tab Oral DAILY Lisa Gupta MD   1 Tab at 07/23/20 0827    And    losartan (COZAAR) tablet 50 mg  50 mg Oral DAILY Lisa Gupta MD   50 mg at 07/23/20 0827    morphine injection 1 mg  1 mg IntraVENous Q4H PRN Lisa Gupta MD   1 mg at 07/23/20 2010    traMADoL (ULTRAM) tablet 50 mg  50 mg Oral Q6H PRN Lisa Gupta MD           Past History     Past Medical History:  Past Medical History:   Diagnosis Date    Acute alcoholic pancreatitis 9/0/5419    Alcohol withdrawal ([address] 2/4/2020    Allergic rhinitis     Depression     Diverticulitis     GERD (gastroesophageal reflux disease)     Headache(784.0)     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    Nausea & vomiting 2/4/2020    Substance abuse [address] [address]     Opiate and ETOH Abuse    Tobacco dependence        Past Surgical History:  Past Surgical History:   Procedure Laterality Date    HX CORONARY STENT PLACEMENT  03/19/2020    MLAD    HX HERNIA REPAIR      HX ORTHOPAEDIC      L wrist    HX ORTHOPAEDIC      pins in right hand       Family History:  Family History   Adopted: Yes   Family history unknown: Yes       Social History:  Social History     Tobacco Use    Smoking status: Current Every Day Smoker     Packs/day: 1.00     Years: 20.00     Pack years: 20.00    Smokeless tobacco: Never Used   Substance Use Topics    Alcohol use: Yes     Comment: 1L of pratik /day    Drug use: Not Currently     Frequency: 5.0 times per week     Types: Marijuana     Comment: occasional       Allergies: Allergies   Allergen Reactions    Phenergan [Promethazine] Other (comments)     Agitation    Risperidone Other (comments)     seizures    Zyprexa [Olanzapine] Other (comments)     Headaches         Review of Systems   As per HPI, otherwise reviewed and negative. Physical Exam     Vitals:    07/14/20 0305   BP: 129/83   Pulse: (!) 101   Resp: 16   Temp: 98.4 °F (36.9 °C)   SpO2: 97%   Weight: 83.9 kg (185 lb)   Height: 5' 11\" (1.803 m)       Gen: Intoxicated appearing, but in no acute distress  HEENT: Normocephalic, sclera anicteric  Cardiovascular: Tachycardic, regular rhythm, no murmurs, rubs, gallops. Pulses intact and equal distally. Pulmonary: No respiratory distress. No stridor. Clear lungs. ABD: Soft, tender in the epigastrium, negative Bocanegra sign, nondistended. Neuro: Alert. Slurred speech. Altered mentation. Psych: Normal thought content and thought processes. : No CVA tenderness  EXT: Moves all extremities well. No cyanosis or clubbing. Skin: Warm and well-perfused. Diagnostic Study Results     Labs -   No results found for this or any previous visit (from the past 12 hour(s)). Radiologic Studies -   No orders to display     CT Results  (Last 48 hours)               07/22/20 0124  CT ABD PELV W CONT Final result    Impression:  IMPRESSION:       1. Cholecystectomy.    2. Distended bladder. 3. Wall thickening of stomach possibly due to incomplete distention versus   gastritis. Narrative:  DICOM format image data is available to nonaffiliated external healthcare   facilities or entities on a secure, media free, reciprocally searchable basis   with patient authorization for 12 months following the date of the study. TECHNIQUE:    CT of the abdomen and pelvis WITH intravenous contrast.  The abdomen and pelvis   were scanned utilizing a multidetector helical scanner from the diaphragm to the   lesser trochanter after the oral administration of barium. Coronal and sagittal   reformations were obtained. COMPARISON: CT abdomen pelvis 6/12/2020       INDICATION: Abdominal pain, gastritis, colitis           DISCUSSION:                   LOWER THORAX: Minimal atelectasis versus scarring bilateral lower lobes. HEPATOBILIARY: Cholecystectomy. Liver unremarkable. SPLEEN: No splenomegaly or focal lesion. PANCREAS: No focal masses or ductal dilatation. ADRENALS: No adrenal nodules. KIDNEYS/URETERS: No hydronephrosis, stones, or solid mass lesions. PELVIC ORGANS/BLADDER: Distended bladder. Prostate normal size. PERITONEUM / RETROPERITONEUM: No free air or fluid. LYMPH NODES: No lymphadenopathy. VESSELS: Mild calcification in nondilated aorta and iliac vessels. GI TRACT: Generalized wall thickening of stomach with underdistention. Mild   concentric wall thickening of mid to distal transverse colon and splenic flexure   similar to prior study likely due to incomplete distention. Bowel structures otherwise unremarkable. BONES AND SOFT TISSUES: Unremarkable. CXR Results  (Last 48 hours)    None            Medical Decision Making   I am the first provider for this patient. I reviewed the vital signs, available nursing notes, past medical history, past surgical history, family history and social history.     Vital Signs-Reviewed the patient's vital signs. Records Reviewed: By myself personally on initial evaluation    MDM:   Patient presents with abdominal pain, alcohol intoxication, and request for alcohol withdrawal treatment. Exam significant for tenderness in the epigastrium with stigmata of alcohol intoxication  DDX considered: Gastritis, diverticulitis, pancreatitis, alcohol intoxication, request for detoxification, secondary gain. DDX thought to be less likely but also considered due to high risk condition: Cholangitis, mesenteric ischemia. Patient was discharged on a phenobarbital taper, and did not comply    Plan:   Pain Control  Antiemetics  Close Observation    Orders as below:  Orders Placed This Encounter    DISCONTD: acetaminophen (TYLENOL) tablet 1,000 mg        ED Course:   Patient stable throughout ED course. At 0 635, the patient requested discharge home. Gait evaluation in progress. Disposition:  Discharge home    DISCHARGE NOTE:     Pt has been reexamined. Patient has no new complaints, changes, or physical findings. Care plan outlined and precautions discussed. Results were reviewed with the patient. All medications were reviewed with the patient; will d/c home with no changes to meds. All of pt's questions and concerns were addressed. Alarm symptoms and return precautions were discussed in detail with the patient. The patient demonstrates adequate understanding. Patient was instructed and agrees to follow up with PCP, as well as to return to the ED upon further deterioration. Patient is ready to go home. The patient understands and agrees with this plan. Patient is very happy with her care. Follow-up Information    None         Discharge Medication List as of 7/14/2020  7:16 AM          Procedures:  Procedures      Critical Care Time:       Diagnosis     Clinical Impression:   1. Alcohol use    2.  Abdominal pain, epigastric        Signed,  Gaetano Young MD  Emergency Physician  [facility]    As a voice dictation software was utilized to dictate this note, minor word transpositions can occur. I apologize for confusing wording and typographic errors. Please feel free to contact me for clarification.

## 2020-07-14 NOTE — ED TRIAGE NOTES
Patient comes in stating that he has abdominal pain. Patient is a chronic alcoholic and his last drink was about an hour ago. Patient is supposed to be on 2 antibiotics for his colitis and pancreatitis but he hasn't been taking them. Patient states that he is also here to detoxify from alcohol. Patient just continues to say that \"he can't do thi anymore. \"

## 2020-07-14 NOTE — ED TRIAGE NOTES
Pt coming in for abdominal pain. Is chronic alcoholic, hx of withdrawal seizures. Pt shaky, tremulous. Last drink 3am today. Alert, oriented.     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

## 2020-07-15 PROCEDURE — 74011250636 HC RX REV CODE- 250/636: Performed by: EMERGENCY MEDICINE

## 2020-07-15 RX ORDER — DIAZEPAM 10 MG/2ML
10 INJECTION INTRAMUSCULAR
Status: COMPLETED | OUTPATIENT
Start: 2020-07-15 | End: 2020-07-15

## 2020-07-15 RX ADMIN — DIAZEPAM 10 MG: 5 INJECTION, SOLUTION INTRAMUSCULAR; INTRAVENOUS at 01:07

## 2020-07-15 NOTE — DISCHARGE INSTRUCTIONS
Patient Education        Learning About Alcohol Use Disorder  What is alcohol use disorder? Alcohol use disorder means that a person drinks alcohol even though it causes harm to themselves or others. It can range from mild to severe. The more signs of this disorder you have, the more severe it may be. Moderate to severe alcohol use disorder is sometimes called addiction. People who have it may find it hard to control their use of alcohol. People who have this disorder may argue with others about how much they're drinking. Their job may be affected because of drinking. They may drink when it's dangerous or illegal, such as when they drive. They also may have a strong need, or craving, to drink. They may feel like they must drink just to get by. Their drinking may increase their risk of getting hurt or being in a car crash. Over time, drinking too much alcohol may cause health problems. These may include high blood pressure, liver problems, or problems with digestion. What are the signs? Maybe you've wondered about your alcohol habits, or how to tell if your drinking is becoming a problem. Here are some of the signs of alcohol use disorder. You may have it if you have two or more of the following signs:  · You drink larger amounts of alcohol than you ever meant to. Or you've been drinking for a longer time than you ever meant to. · You can't cut down or control your use. Or you constantly wish you could cut down. · You spend a lot of time getting or drinking alcohol or recovering from its effects. · You have strong cravings for alcohol. · You can no longer do your main jobs at work, at school, or at home. · You keep drinking alcohol, even though your use hurts your relationships. · You have stopped doing important activities because of your alcohol use. · You drink alcohol in situations where doing so is dangerous. · You keep drinking alcohol even though you know it's causing health problems.   · You need more and more alcohol to get the same effect, or you get less effect from the same amount over time. This is called tolerance. · You have uncomfortable symptoms when you stop drinking alcohol or use less. This is called withdrawal.  Alcohol use disorder can range from mild to severe. The more signs you have, the more severe the disorder may be. Moderate to severe alcohol use disorder is sometimes called addiction. You might not realize that your drinking is a problem. You might not drink large amounts when you drink. Or you might go for days or weeks between drinking episodes. But even if you don't drink very often, your drinking could still be harmful and put you at risk. How is alcohol use disorder treated? Getting help is up to you. But you don't have to do it alone. There are many people and kinds of treatments that can help. Treatment for alcohol use disorder can include:  · Group therapy, one or more types of counseling, and alcohol education. · Medicines that help to:  ? Reduce withdrawal symptoms and help you safely stop drinking. ? Reduce cravings for alcohol. · Support groups. These groups include Alcoholics Anonymous and [employer] (Self-Management and Recovery Training). Some people are able to stop or cut back on drinking with help from a counselor. People who have moderate to severe alcohol use disorder may need medical treatment. They may need to stay in a hospital or treatment center. You may have a treatment team to help you. This team may include a psychologist or psychiatrist, counselors, doctors, social workers, nurses, and a . A  helps plan and manage your treatment. Follow-up care is a key part of your treatment and safety. Be sure to make and go to all appointments, and call your doctor if you are having problems. It's also a good idea to know your test results and keep a list of the medicines you take. Where can you learn more?   Go to http://www.gray.com/  Enter [id number] in the search box to learn more about \"Learning About Alcohol Use Disorder. \"  Current as of: August 22, 2019               Content Version: 12.5  © 5368-6746 [employer]. Care instructions adapted under license by [employer] (which disclaims liability or warranty for this information). If you have questions about a medical condition or this instruction, always ask your healthcare professional. [place] [address] any warranty or liability for your use of this information. Patient Education        Learning About Alcohol Withdrawal  What is alcohol withdrawal?     If you drink alcohol regularly (more than a few drinks on most days) and then suddenly stop or cut down, you may go through some physical and emotional problems while the alcohol clears out of your system. This is called withdrawal. Clearing the alcohol from your body is called detoxification, or detox. What are the symptoms? Symptoms of alcohol withdrawal may start as soon as 4 to 12 hours after you stop drinking. Or they may not start until several days after the last drink. Mild symptoms include:  · Nausea. · Sweating. · Shakiness. · Diarrhea. · Intense worry. · Disturbed sleep. · Headache. More severe symptoms include:  · Vomiting or belly pain. · Being confused, upset, and irritable. · Changed sensations. You might feel things on your body that aren't really there. Or you may see or hear things that aren't there. · Trembling. · Being short of breath or having pain in your chest.  · Having seizures. Symptoms may peak within a few days. Mild symptoms can last for a few weeks. If your symptoms are severe, you'll need to see a doctor.   What is the treatment for alcohol withdrawal?  Most people may be able to cut down or stop drinking with only mild withdrawal. They can stay safe by simply resting, drinking lots of fluids, and eating healthy foods. But people who drink large amounts of alcohol or are at risk for severe withdrawal symptoms should not try to detox at home unless they work closely with a doctor to manage it. A person can die of severe alcohol withdrawal.  Before you stop drinking, talk to your doctor about how you plan to stop. Be completely honest about how much you've been drinking. Your doctor will figure out if you need to detox in a medical center. You may get medicine to treat the symptoms whether you are at home or in a medical center. Medicine that treats seizures can also help. Your doctor will explain what types of medicine might help you. You may start with a high dose and then take smaller amounts over several days. There's also medicine that can help you avoid alcohol while you recover. How can you manage your withdrawal and recovery? Here are a few tips that can help you to not start drinking again. · Make sure there's no alcohol in the house. This includes drinks as well as liquid medicines, rubbing alcohol, and certain flavorings like vanilla extract. · Try not to hang out with people you used to drink with. · Don't go it alone. Spend time with people who support the changes you are making in your life. This includes asking for advice and help from people who have stopped drinking. You might also try mutual support groups such as Alcoholics Anonymous. · Drink lots of fluids. · Eat snacks such as fruit, cheese and crackers, and pretzels. High-carbohydrate foods may help reduce the craving for alcohol. What happens after withdrawal?  It can be hard to stop drinking. But after you clear the alcohol from your system, you can start the next, healthier part of your life. After detox, you will focus on staying alcohol-free. You can learn skills that you can use to stay abstinent (or sober) as you recover. Finding new ways to deal with life's challenges, without drinking, takes time and effort.  Recovery is a long-term process. It's not something you can achieve in a few weeks. Most people get some type of therapy, such as group counseling. You also may need medicine to help you stay sober. Treatment doesn't focus on alcohol use alone. It may address other parts of your life, like your relationships, work, medical problems, and home life. Treatment, support, patience, and commitment will help you make the changes you need to live a [place] life without alcohol. You may find, over time, that the process gets easier, life becomes more joyous, and your connections to others becomes more rewarding. Where can you find help? Behavioral Health Treatment Services . This service from the [facility] and [facility]  can help you find local alcohol treatment services. Search online at [employer]. [employer]a.gov or call 1-754-346-FYNJ ([phone]), or [employer] 0-721.339.7578. Where can you learn more? Go to http://www.gray.com/  Enter A011 in the search box to learn more about \"Learning About Alcohol Withdrawal.\"  Current as of: August 22, 2019               Content Version: 12.5  © 6812-7883 [employer]. Care instructions adapted under license by [employer] (which disclaims liability or warranty for this information). If you have questions about a medical condition or this instruction, always ask your healthcare professional. [address] any warranty or liability for your use of this information.

## 2020-07-21 PROBLEM — F10.931 DELIRIUM TREMENS (HCC): Status: ACTIVE | Noted: 2020-07-21

## 2020-07-21 PROBLEM — R11.2 NAUSEA AND VOMITING: Status: ACTIVE | Noted: 2020-07-21

## 2020-07-21 PROBLEM — K29.20 ALCOHOLIC GASTRITIS: Status: ACTIVE | Noted: 2020-07-21

## 2020-07-21 PROBLEM — R10.9 ABDOMINAL PAIN: Status: ACTIVE | Noted: 2020-07-21

## 2020-08-02 PROBLEM — R10.13 INTRACTABLE EPIGASTRIC ABDOMINAL PAIN: Status: ACTIVE | Noted: 2020-08-02

## 2020-08-02 PROBLEM — R11.2 INTRACTABLE NAUSEA AND VOMITING: Status: ACTIVE | Noted: 2020-08-02

## 2020-08-02 PROBLEM — F10.939 ALCOHOL WITHDRAWAL SYNDROME (HCC): Status: ACTIVE | Noted: 2020-08-02

## 2020-08-27 PROBLEM — I16.9 HYPERTENSIVE CRISIS: Status: ACTIVE | Noted: 2020-08-27

## 2020-11-15 PROBLEM — D72.829 LEUKOCYTOSIS: Status: ACTIVE | Noted: 2020-11-15

## 2021-09-01 ENCOUNTER — OFFICE VISIT (OUTPATIENT)
Dept: FAMILY MEDICINE CLINIC | Age: 41
End: 2021-09-01
Payer: MEDICAID

## 2021-09-01 VITALS
BODY MASS INDEX: 24.11 KG/M2 | HEIGHT: 72 IN | WEIGHT: 178 LBS | DIASTOLIC BLOOD PRESSURE: 98 MMHG | OXYGEN SATURATION: 98 % | RESPIRATION RATE: 16 BRPM | TEMPERATURE: 98.7 F | SYSTOLIC BLOOD PRESSURE: 160 MMHG | HEART RATE: 93 BPM

## 2021-09-01 DIAGNOSIS — I25.83 CORONARY ARTERY DISEASE DUE TO LIPID RICH PLAQUE: ICD-10-CM

## 2021-09-01 DIAGNOSIS — E78.2 MIXED HYPERLIPIDEMIA: ICD-10-CM

## 2021-09-01 DIAGNOSIS — I25.10 CORONARY ARTERY DISEASE DUE TO LIPID RICH PLAQUE: ICD-10-CM

## 2021-09-01 DIAGNOSIS — E11.9 TYPE 2 DIABETES MELLITUS WITHOUT COMPLICATION, WITHOUT LONG-TERM CURRENT USE OF INSULIN (HCC): ICD-10-CM

## 2021-09-01 DIAGNOSIS — I10 ESSENTIAL HYPERTENSION: ICD-10-CM

## 2021-09-01 DIAGNOSIS — F10.10 ALCOHOL ABUSE: Primary | ICD-10-CM

## 2021-09-01 DIAGNOSIS — K86.1 CHRONIC RECURRENT PANCREATITIS (HCC): ICD-10-CM

## 2021-09-01 PROCEDURE — 99204 OFFICE O/P NEW MOD 45 MIN: CPT | Performed by: NURSE PRACTITIONER

## 2021-09-01 RX ORDER — ROSUVASTATIN CALCIUM 10 MG/1
10 TABLET, COATED ORAL
Qty: 90 TABLET | Refills: 1 | Status: SHIPPED | OUTPATIENT
Start: 2021-09-01

## 2021-09-01 RX ORDER — DEXTROMETHORPHAN HYDROBROMIDE AND QUINIDINE SULFATE 20; 10 MG/1; MG/1
1 CAPSULE, GELATIN COATED ORAL DAILY
COMMUNITY

## 2021-09-01 RX ORDER — AMLODIPINE BESYLATE 5 MG/1
5 TABLET ORAL DAILY
Qty: 90 TABLET | Refills: 1 | Status: SHIPPED | OUTPATIENT
Start: 2021-09-01 | End: 2021-10-06

## 2021-09-01 RX ORDER — FAMOTIDINE 20 MG/1
20 TABLET, FILM COATED ORAL DAILY
COMMUNITY

## 2021-09-01 RX ORDER — ASPIRIN 81 MG/1
81 TABLET ORAL DAILY
Qty: 90 TABLET | Refills: 3 | Status: SHIPPED | OUTPATIENT
Start: 2021-09-01

## 2021-09-01 NOTE — PROGRESS NOTES
Vignesh Santoyo is a 41 y.o. male (: 1980) presenting to address:    Chief Complaint   Patient presents with    GI Problem     abdominal pain . pancreaitis due to alcohol . also has vomitting that comes and goes says ernie can be green or black .  Alcohol Problem     would like help with addiction        Vitals:    21 1048   BP: (!) 164/102   Pulse: 93   Resp: 16   Temp: 98.7 °F (37.1 °C)   TempSrc: Temporal   SpO2: 98%   Weight: 178 lb (80.7 kg)   Height: 5' 11.75\" (1.822 m)   PainSc:   0 - No pain       Hearing/Vision:   No exam data present    Learning Assessment:   No flowsheet data found. Depression Screening:     3 most recent PHQ Screens 2021   PHQ Not Done Active Diagnosis of Depression or Bipolar Disorder     Fall Risk Assessment:     Fall Risk Assessment, last 12 mths 2021   Able to walk? Yes   Fall in past 12 months? 0   Do you feel unsteady? 0   Are you worried about falling 0     Abuse Screening:     Abuse Screening Questionnaire 2021   Do you ever feel afraid of your partner? N   Are you in a relationship with someone who physically or mentally threatens you? N   Is it safe for you to go home? Y     Coordination of Care Questionaire:   1. Have you been to the ER, urgent care clinic since your last visit? Hospitalized since your last visit? NO    2. Have you seen or consulted any other health care providers outside of the [address] [place] [address] since your last visit? Include any pap smears or colon screening. NO    Advanced Directive:   1. Do you have an Advanced Directive? NO    2. Would you like information on Advanced Directives?  NO

## 2021-09-01 NOTE — PROGRESS NOTES
HPI  Edgar Arango is a 41y.o. year old male who presents today for alcohol use. Would like help with stopping drinking. Used to drink about half a gallon a day of liquor, now he drinks 4-5 \"clubtails\" daily. Has had several hospital admissions for alcoholism and withdrawal. Has a history of past trauma and uses alcohol to cope. Does not see GI. Had a stent placed in his heart last year. Does not see cardiology. 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    Nausea & vomiting 2/4/2020    Schizophrenia ([facility]     Substance abuse (HCC)     Opiate and ETOH Abuse    Tobacco dependence        Past Surgical History:   Procedure Laterality Date    HX CORONARY STENT PLACEMENT  03/19/2020    MLAD    HX HERNIA REPAIR      HX ORTHOPAEDIC      L wrist    HX ORTHOPAEDIC      pins in right hand       Social History     Tobacco Use    Smoking status: Current Every Day Smoker     Packs/day: 1.00     Years: 20.00     Pack years: 20.00     Types: Cigarettes    Smokeless tobacco: Never Used   Vaping Use    Vaping Use: Never used   Substance Use Topics    Alcohol use: Yes     Comment: 3 to 4 clubtails     Drug use: Yes     Frequency: 5.0 times per week     Types: Marijuana, Prescription     Comment: adderol on occasion         Current Outpatient Medications:     cariprazine (Vraylar) 4.5 mg capsule, Take  by mouth daily. , Disp: , Rfl:     famotidine (Pepcid AC) 20 mg tablet, Take 20 mg by mouth daily. , Disp: , Rfl:     dextromethorphan-quiNIDine (Nuedexta) 20-10 mg per capsule, Take 1 Capsule by mouth daily. , Disp: , Rfl:     omeprazole (PRILOSEC) 40 mg capsule, Take 40 mg by mouth daily. , Disp: , Rfl:     metoprolol succinate (TOPROL-XL) 50 mg XL tablet, Take 50 mg by mouth daily. (Patient not taking: Reported on 9/1/2021), Disp: , Rfl:     losartan-hydroCHLOROthiazide (HYZAAR) 50-12.5 mg per tablet, Take 1 Tab by mouth daily. (Patient not taking: Reported on 9/1/2021), Disp: , Rfl:     QUEtiapine (SEROqueL) 300 mg tablet, Take 300 mg by mouth daily. (Patient not taking: Reported on 9/1/2021), Disp: , Rfl:     naltrexone (DEPADE) 50 mg tablet, Take 50 mg by mouth daily. (Patient not taking: Reported on 9/1/2021), Disp: , Rfl:     lithium carbonate CR (ESKALITH CR) 450 mg CR tablet, Take 450 mg by mouth two (2) times a day. (Patient not taking: Reported on 9/1/2021), Disp: , Rfl:     ergocalciferol (ERGOCALCIFEROL) 1,250 mcg (50,000 unit) capsule, Take 1 Cap by mouth every seven (7) days. (Patient not taking: Reported on 9/1/2021), Disp: 4 Cap, Rfl: 5     Allergies   Allergen Reactions    Phenergan [Promethazine] Other (comments)     Agitation    Risperidone Other (comments)     seizures    Zyprexa [Olanzapine] Other (comments)     Headaches       Review of Systems   Constitutional: Negative for chills, fever, malaise/fatigue and weight loss. HENT: Negative for congestion, ear pain, hearing loss, sinus pain, sore throat and tinnitus. Eyes: Negative for blurred vision, double vision, photophobia and pain. Respiratory: Negative for cough and shortness of breath. Cardiovascular: Negative for chest pain, palpitations and leg swelling. Gastrointestinal: Positive for vomiting (infrequent). Negative for abdominal pain, constipation, diarrhea, heartburn and nausea. Genitourinary: Negative for dysuria, frequency and urgency. Musculoskeletal: Negative for back pain, joint pain and myalgias. Skin: Negative for rash. Neurological: Negative for dizziness and headaches. Psychiatric/Behavioral: Negative for depression and suicidal ideas. The patient is not nervous/anxious.           PE  BP (!) 160/98   Pulse 93   Temp 98.7 °F (37.1 °C) (Temporal)   Resp 16   Ht 5' 11.75\" (1.822 m)   Wt 178 lb (80.7 kg)   SpO2 98%   BMI 24.31 kg/m²     Physical Exam  Vitals reviewed. Constitutional:       General: He is not in acute distress. Appearance: Normal appearance. HENT:      Head: Normocephalic and atraumatic. Cardiovascular:      Rate and Rhythm: Normal rate and regular rhythm. Heart sounds: S1 normal and S2 normal. No murmur heard. No friction rub. No gallop. No S3 or S4 sounds. Pulmonary:      Effort: Pulmonary effort is normal.      Breath sounds: Normal breath sounds. No wheezing, rhonchi or rales. Abdominal:      General: Bowel sounds are normal.      Palpations: Abdomen is soft. Tenderness: There is no abdominal tenderness. Musculoskeletal:      Right lower leg: No edema. Left lower leg: No edema. Skin:     General: Skin is warm and dry. Capillary Refill: Capillary refill takes less than 2 seconds. Neurological:      Mental Status: He is alert and oriented to person, place, and time. Assessment/Plan  1. Alcoholism  Recommend detox program for safe alcohol cessation  Recommend FU with psychiatry after detox for possible medication to help maintain sobriety  Refer to GI for recurrent pancreatitis and liver FU    2. CAD, HTN  Amlodipine 5 every day  Refer to cardiology  Start daily baby aspirin  FU OV 1 month recheck BP  Fasting labs    3. HLD  crestor 10 every day  Fasting labs    4.  T2DM  A1C

## 2021-09-09 ENCOUNTER — APPOINTMENT (OUTPATIENT)
Dept: FAMILY MEDICINE CLINIC | Age: 41
End: 2021-09-09

## 2021-09-09 DIAGNOSIS — E11.9 TYPE 2 DIABETES MELLITUS WITHOUT COMPLICATION, WITHOUT LONG-TERM CURRENT USE OF INSULIN (HCC): ICD-10-CM

## 2021-09-09 DIAGNOSIS — I25.10 CORONARY ARTERY DISEASE DUE TO LIPID RICH PLAQUE: ICD-10-CM

## 2021-09-09 DIAGNOSIS — I10 ESSENTIAL HYPERTENSION: ICD-10-CM

## 2021-09-09 DIAGNOSIS — E78.2 MIXED HYPERLIPIDEMIA: ICD-10-CM

## 2021-09-09 DIAGNOSIS — I25.83 CORONARY ARTERY DISEASE DUE TO LIPID RICH PLAQUE: ICD-10-CM

## 2021-09-10 LAB
ALBUMIN SERPL-MCNC: 4.1 G/DL (ref 4–5)
ALBUMIN/GLOB SERPL: 2.1 {RATIO} (ref 1.2–2.2)
ALP SERPL-CCNC: 128 IU/L (ref 48–121)
ALT SERPL-CCNC: 18 IU/L (ref 0–44)
APPEARANCE UR: CLEAR
AST SERPL-CCNC: 26 IU/L (ref 0–40)
BASOPHILS # BLD AUTO: 0.1 X10E3/UL (ref 0–0.2)
BASOPHILS NFR BLD AUTO: 1 %
BILIRUB SERPL-MCNC: 0.4 MG/DL (ref 0–1.2)
BILIRUB UR QL STRIP: NEGATIVE
BUN SERPL-MCNC: 12 MG/DL (ref 6–24)
BUN/CREAT SERPL: 14 (ref 9–20)
CALCIUM SERPL-MCNC: 9 MG/DL (ref 8.7–10.2)
CHLORIDE SERPL-SCNC: 103 MMOL/L (ref 96–106)
CHOLEST SERPL-MCNC: 178 MG/DL (ref 100–199)
CO2 SERPL-SCNC: 24 MMOL/L (ref 20–29)
COLOR UR: YELLOW
CREAT SERPL-MCNC: 0.87 MG/DL (ref 0.76–1.27)
EOSINOPHIL # BLD AUTO: 0.4 X10E3/UL (ref 0–0.4)
EOSINOPHIL NFR BLD AUTO: 3 %
ERYTHROCYTE [DISTWIDTH] IN BLOOD BY AUTOMATED COUNT: 14.2 % (ref 11.6–15.4)
EST. AVERAGE GLUCOSE BLD GHB EST-MCNC: 126 MG/DL
GLOBULIN SER CALC-MCNC: 2 G/DL (ref 1.5–4.5)
GLUCOSE SERPL-MCNC: 125 MG/DL (ref 65–99)
GLUCOSE UR QL: NEGATIVE
HBA1C MFR BLD: 6 % (ref 4.8–5.6)
HCT VFR BLD AUTO: 48.9 % (ref 37.5–51)
HDLC SERPL-MCNC: 55 MG/DL
HGB BLD-MCNC: 15.8 G/DL (ref 13–17.7)
HGB UR QL STRIP: NEGATIVE
IMM GRANULOCYTES # BLD AUTO: 0.2 X10E3/UL (ref 0–0.1)
IMM GRANULOCYTES NFR BLD AUTO: 1 %
IMP & REVIEW OF LAB RESULTS: NORMAL
KETONES UR QL STRIP: NEGATIVE
LDLC SERPL CALC-MCNC: 88 MG/DL (ref 0–99)
LEUKOCYTE ESTERASE UR QL STRIP: NEGATIVE
LYMPHOCYTES # BLD AUTO: 2.6 X10E3/UL (ref 0.7–3.1)
LYMPHOCYTES NFR BLD AUTO: 22 %
MCH RBC QN AUTO: 29.4 PG (ref 26.6–33)
MCHC RBC AUTO-ENTMCNC: 32.3 G/DL (ref 31.5–35.7)
MCV RBC AUTO: 91 FL (ref 79–97)
MICRO URNS: NORMAL
MONOCYTES # BLD AUTO: 0.8 X10E3/UL (ref 0.1–0.9)
MONOCYTES NFR BLD AUTO: 7 %
NEUTROPHILS # BLD AUTO: 7.6 X10E3/UL (ref 1.4–7)
NEUTROPHILS NFR BLD AUTO: 66 %
NITRITE UR QL STRIP: NEGATIVE
PH UR STRIP: 6.5 [PH] (ref 5–7.5)
PLATELET # BLD AUTO: 190 X10E3/UL (ref 150–450)
POTASSIUM SERPL-SCNC: 3.9 MMOL/L (ref 3.5–5.2)
PROT SERPL-MCNC: 6.1 G/DL (ref 6–8.5)
PROT UR QL STRIP: NEGATIVE
RBC # BLD AUTO: 5.37 X10E6/UL (ref 4.14–5.8)
SODIUM SERPL-SCNC: 143 MMOL/L (ref 134–144)
SP GR UR: 1.01 (ref 1–1.03)
TRIGL SERPL-MCNC: 212 MG/DL (ref 0–149)
UROBILINOGEN UR STRIP-MCNC: 0.2 MG/DL (ref 0.2–1)
VLDLC SERPL CALC-MCNC: 35 MG/DL (ref 5–40)
WBC # BLD AUTO: 11.6 X10E3/UL (ref 3.4–10.8)

## 2021-09-14 DIAGNOSIS — F10.10 ETOH ABUSE: Primary | ICD-10-CM

## 2021-09-14 DIAGNOSIS — R79.89 ABNORMAL LFTS: ICD-10-CM

## 2021-09-14 RX ORDER — METFORMIN HYDROCHLORIDE 500 MG/1
TABLET ORAL
Qty: 180 TABLET | Refills: 1 | Status: SHIPPED | OUTPATIENT
Start: 2021-09-14

## 2021-09-14 RX ORDER — ROSUVASTATIN CALCIUM 20 MG/1
20 TABLET, COATED ORAL
Qty: 90 TABLET | Refills: 1 | Status: SHIPPED | OUTPATIENT
Start: 2021-09-14 | End: 2021-11-12 | Stop reason: SDUPTHER

## 2021-09-14 RX ORDER — FENOFIBRATE 145 MG/1
145 TABLET, COATED ORAL DAILY
Qty: 90 TABLET | Refills: 1 | Status: SHIPPED | OUTPATIENT
Start: 2021-09-14

## 2021-09-14 NOTE — PROGRESS NOTES
Call - multiple things regarding labs  1. Cholesterol is elevated along with triglycerides. I am going to increase his crestor to 20mg and start him on tricor. Repeat lipid panel in 6 weeks. 2. He is prediabetic. Would like to start him on metformin BID since his A1C is very close to diabetic range. 3. One of his liver function tests is a little elevated. Given his history with alcohol use I would like to get an abdominal US to look at his liver since his last imaging was done almost a year ago.

## 2021-10-06 ENCOUNTER — OFFICE VISIT (OUTPATIENT)
Dept: FAMILY MEDICINE CLINIC | Age: 41
End: 2021-10-06
Payer: MEDICAID

## 2021-10-06 VITALS
WEIGHT: 201 LBS | RESPIRATION RATE: 18 BRPM | HEIGHT: 71 IN | OXYGEN SATURATION: 97 % | SYSTOLIC BLOOD PRESSURE: 155 MMHG | HEART RATE: 91 BPM | BODY MASS INDEX: 28.14 KG/M2 | DIASTOLIC BLOOD PRESSURE: 90 MMHG

## 2021-10-06 DIAGNOSIS — E78.2 MIXED HYPERLIPIDEMIA: ICD-10-CM

## 2021-10-06 DIAGNOSIS — I70.90 ATHEROSCLEROSIS OF ARTERIES: ICD-10-CM

## 2021-10-06 DIAGNOSIS — I10 PRIMARY HYPERTENSION: Primary | ICD-10-CM

## 2021-10-06 DIAGNOSIS — Z95.5 HISTORY OF CORONARY ANGIOPLASTY WITH INSERTION OF STENT: ICD-10-CM

## 2021-10-06 PROCEDURE — 99213 OFFICE O/P EST LOW 20 MIN: CPT | Performed by: NURSE PRACTITIONER

## 2021-10-06 RX ORDER — ACETAMINOPHEN 500 MG
TABLET ORAL
Qty: 1 KIT | Refills: 0 | Status: SHIPPED | OUTPATIENT
Start: 2021-10-06

## 2021-10-06 RX ORDER — AMLODIPINE BESYLATE 10 MG/1
10 TABLET ORAL DAILY
Qty: 30 TABLET | Refills: 3 | Status: SHIPPED | OUTPATIENT
Start: 2021-10-06

## 2021-10-06 NOTE — PROGRESS NOTES
Donavon Webb is a 41 y.o. male (: 1980) presenting to address:    Chief Complaint   Patient presents with    Elevated Blood Pressure     follow up       Vitals:    10/06/21 0959   BP: (!) 178/110   Pulse: 91   Resp: 18   SpO2: 97%   Weight: 201 lb (91.2 kg)   Height: 5' 11\" (1.803 m)   PainSc:   0 - No pain       Hearing/Vision:   No exam data present    Learning Assessment:   No flowsheet data found. Depression Screening:     3 most recent PHQ Screens 10/6/2021   PHQ Not Done -   Little interest or pleasure in doing things Not at all   Feeling down, depressed, irritable, or hopeless Not at all   Total Score PHQ 2 0     Fall Risk Assessment:     Fall Risk Assessment, last 12 mths 2021   Able to walk? Yes   Fall in past 12 months? 0   Do you feel unsteady? 0   Are you worried about falling 0     Abuse Screening:     Abuse Screening Questionnaire 2021   Do you ever feel afraid of your partner? N   Are you in a relationship with someone who physically or mentally threatens you? N   Is it safe for you to go home? Y     Coordination of Care Questionaire:   1. Have you been to the ER, urgent care clinic since your last visit? Hospitalized since your last visit? NO    2. Have you seen or consulted any other health care providers outside of the [address] [place] [address] since your last visit? Include any pap smears or colon screening. NO    Advanced Directive:   1. Do you have an Advanced Directive? YES    2. Would you like information on Advanced Directives? No  Patient DECLINED flu vaccine.

## 2021-10-06 NOTE — PROGRESS NOTES
HPI  Gilberto Henriquez is a 41y.o. year old male who presents today for follow up for hypertension. He is now 14 days sober. He reports feeling well without complaints. Reports medication compliance without adverse effects. 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    Nausea & vomiting 2/4/2020    Schizophrenia ([facility] [facility]     Substance abuse (HCC)     Opiate and ETOH Abuse    Tobacco dependence        Past Surgical History:   Procedure Laterality Date    HX CORONARY STENT PLACEMENT  03/19/2020    MLAD    HX HERNIA REPAIR      HX ORTHOPAEDIC      L wrist    HX ORTHOPAEDIC      pins in right hand       Social History     Tobacco Use    Smoking status: Current Every Day Smoker     Packs/day: 1.00     Years: 20.00     Pack years: 20.00     Types: Cigarettes    Smokeless tobacco: Never Used   Vaping Use    Vaping Use: Never used   Substance Use Topics    Alcohol use: Yes     Comment: 3 to 4 clubtails     Drug use: Yes     Frequency: 5.0 times per week     Types: Marijuana, Prescription     Comment: adderol on occasion         Current Outpatient Medications:     fenofibrate nanocrystallized (TRICOR) 145 mg tablet, Take 1 Tablet by mouth daily. , Disp: 90 Tablet, Rfl: 1    rosuvastatin (CRESTOR) 20 mg tablet, Take 1 Tablet by mouth nightly., Disp: 90 Tablet, Rfl: 1    metFORMIN (GLUCOPHAGE) 500 mg tablet, Take 1 tab with dinner daily X 1 week, then increase to 1 tab with breakfast and dinner daily. , Disp: 180 Tablet, Rfl: 1    cariprazine (Vraylar) 4.5 mg capsule, Take  by mouth daily. , Disp: , Rfl:     famotidine (Pepcid AC) 20 mg tablet, Take 20 mg by mouth daily. , Disp: , Rfl:     dextromethorphan-quiNIDine (Nuedexta) 20-10 mg per capsule, Take 1 Capsule by mouth daily. , Disp: , Rfl:     aspirin delayed-release 81 mg tablet, Take 1 Tablet by mouth daily. , Disp: 90 Tablet, Rfl: 3    amLODIPine (NORVASC) 5 mg tablet, Take 1 Tablet by mouth daily. , Disp: 90 Tablet, Rfl: 1    rosuvastatin (CRESTOR) 10 mg tablet, Take 1 Tablet by mouth nightly., Disp: 90 Tablet, Rfl: 1    omeprazole (PRILOSEC) 40 mg capsule, Take 40 mg by mouth daily. , Disp: , Rfl:     metoprolol succinate (TOPROL-XL) 50 mg XL tablet, Take 50 mg by mouth daily. (Patient not taking: Reported on 9/1/2021), Disp: , Rfl:     losartan-hydroCHLOROthiazide (HYZAAR) 50-12.5 mg per tablet, Take 1 Tab by mouth daily. (Patient not taking: Reported on 9/1/2021), Disp: , Rfl:     QUEtiapine (SEROqueL) 300 mg tablet, Take 300 mg by mouth daily. (Patient not taking: Reported on 9/1/2021), Disp: , Rfl:     naltrexone (DEPADE) 50 mg tablet, Take 50 mg by mouth daily. (Patient not taking: Reported on 9/1/2021), Disp: , Rfl:     lithium carbonate CR (ESKALITH CR) 450 mg CR tablet, Take 450 mg by mouth two (2) times a day. (Patient not taking: Reported on 9/1/2021), Disp: , Rfl:     ergocalciferol (ERGOCALCIFEROL) 1,250 mcg (50,000 unit) capsule, Take 1 Cap by mouth every seven (7) days. (Patient not taking: Reported on 9/1/2021), Disp: 4 Cap, Rfl: 5     Allergies   Allergen Reactions    Phenergan [Promethazine] Other (comments)     Agitation    Risperidone Other (comments)     seizures    Zyprexa [Olanzapine] Other (comments)     Headaches       Review of Systems   Constitutional: Negative for chills, fever and malaise/fatigue. Eyes: Negative for blurred vision and double vision. Respiratory: Negative for cough and shortness of breath. Cardiovascular: Negative for chest pain, palpitations and leg swelling. Neurological: Negative for dizziness and headaches.          PE  BP (!) 178/110 (BP 1 Location: Left arm, BP Patient Position: Sitting, BP Cuff Size: Adult) Pulse 91   Resp 18   Ht 5' 11\" (1.803 m)   Wt 201 lb (91.2 kg)   SpO2 97%   BMI 28.03 kg/m²     Physical Exam  Vitals reviewed. Constitutional:       General: He is not in acute distress. Appearance: Normal appearance. HENT:      Head: Normocephalic and atraumatic. Cardiovascular:      Rate and Rhythm: Normal rate and regular rhythm. Heart sounds: S1 normal and S2 normal. No murmur heard. No friction rub. No gallop. No S3 or S4 sounds. Pulmonary:      Effort: Pulmonary effort is normal.      Breath sounds: Normal breath sounds. No wheezing, rhonchi or rales. Musculoskeletal:      Right lower leg: No edema. Left lower leg: No edema. Skin:     General: Skin is warm and dry. Capillary Refill: Capillary refill takes less than 2 seconds. Neurological:      Mental Status: He is alert and oriented to person, place, and time. Assessment/Plan  1.  HTN  Increase amlodipine to 10 every day  Continue to watch salt  Smoking cessation discussed for 10 minutes  Refer to cardiology  Check BP 3-4X/week at home for the next 2 weeks then call with numbers, will reevaluate

## 2021-11-12 ENCOUNTER — OFFICE VISIT (OUTPATIENT)
Dept: CARDIOLOGY CLINIC | Age: 41
End: 2021-11-12
Payer: MEDICAID

## 2021-11-12 VITALS
DIASTOLIC BLOOD PRESSURE: 86 MMHG | TEMPERATURE: 98.4 F | HEART RATE: 91 BPM | BODY MASS INDEX: 28.42 KG/M2 | WEIGHT: 203 LBS | SYSTOLIC BLOOD PRESSURE: 138 MMHG | HEIGHT: 71 IN | OXYGEN SATURATION: 97 %

## 2021-11-12 DIAGNOSIS — I10 PRIMARY HYPERTENSION: ICD-10-CM

## 2021-11-12 DIAGNOSIS — Z76.89 ENCOUNTER TO ESTABLISH CARE: ICD-10-CM

## 2021-11-12 DIAGNOSIS — Z95.5 HISTORY OF CORONARY ANGIOPLASTY WITH INSERTION OF STENT: ICD-10-CM

## 2021-11-12 DIAGNOSIS — Z92.89 HISTORY OF ECHOCARDIOGRAM: ICD-10-CM

## 2021-11-12 DIAGNOSIS — Z92.89 HISTORY OF NUCLEAR STRESS TEST: ICD-10-CM

## 2021-11-12 DIAGNOSIS — Z95.5 S/P CORONARY ARTERY STENT PLACEMENT: Primary | ICD-10-CM

## 2021-11-12 DIAGNOSIS — I10 HYPERTENSION, UNSPECIFIED TYPE: ICD-10-CM

## 2021-11-12 PROCEDURE — 99204 OFFICE O/P NEW MOD 45 MIN: CPT | Performed by: INTERNAL MEDICINE

## 2021-11-12 PROCEDURE — 93000 ELECTROCARDIOGRAM COMPLETE: CPT | Performed by: INTERNAL MEDICINE

## 2021-11-12 RX ORDER — DEXTROAMPHETAMINE SACCHARATE, AMPHETAMINE ASPARTATE, DEXTROAMPHETAMINE SULFATE AND AMPHETAMINE SULFATE 5; 5; 5; 5 MG/1; MG/1; MG/1; MG/1
20 TABLET ORAL 3 TIMES DAILY
COMMUNITY

## 2021-11-12 NOTE — PATIENT INSTRUCTIONS
New Medication/Medication Changes  Take crestor daily     **please allow 24-48 hrs for medication to be escribed to pharmacy** If you need any refills on medications please contact your pharmacy so that the request can be escribed to the provider for review.       Other   DME Supply order for Blood pressure K[facility]

## 2021-11-12 NOTE — PROGRESS NOTES
Sukhjinder Villavicencio presents today for   Chief Complaint   Patient presents with   Aburto New Patient     elevated lipids, follow up on cardiology stent       Is someone accompanying this pt? yes    Is the patient using any DME equipment during [address]? no    Depression Screening:  3 most recent PHQ Screens 10/6/2021   PHQ Not Done -   Little interest or pleasure in doing things Not at all   Feeling down, depressed, irritable, or hopeless Not at all   Total Score PHQ 2 0       Learning Assessment:  No flowsheet data found. Abuse Screening:  Abuse Screening Questionnaire 9/1/2021   Do you ever feel afraid of your partner? N   Are you in a relationship with someone who physically or mentally threatens you? N   Is it safe for you to go home? Y       Fall Risk  Fall Risk Assessment, last 12 mths 9/1/2021   Able to walk? Yes   Fall in past 12 months? 0   Do you feel unsteady? 0   Are you worried about falling 0       OPIOID RISK TOOL  No flowsheet data found. Coordination of Care:  1. Have you been to the ER, urgent care clinic since your last visit? no  Hospitalized since your last visit? no    2. Have you seen or consulted any other health care providers outside of the [address] [place] [address] since your last visit? no Include any pap smears or colon screening.  no

## 2021-11-12 NOTE — TELEPHONE ENCOUNTER
PCP: Carmelo Lorenz NP    Last appt: 11/12/2021  Future Appointments   Date Time Provider Roxy La   11/15/2021 10:00 AM LAB_[facility] BS AMB       Requested Prescriptions     Pending Prescriptions Disp Refills    rosuvastatin (CRESTOR) 20 mg tablet 90 Tablet 5     Sig: Take 1 Tablet by mouth nightly.

## 2021-11-15 RX ORDER — ROSUVASTATIN CALCIUM 20 MG/1
20 TABLET, COATED ORAL
Qty: 90 TABLET | Refills: 5 | Status: SHIPPED | OUTPATIENT
Start: 2021-11-15

## 2021-11-20 PROBLEM — Z92.89 HISTORY OF ECHOCARDIOGRAM: Status: ACTIVE | Noted: 2021-11-20

## 2021-11-20 PROBLEM — I10 PRIMARY HYPERTENSION: Status: ACTIVE | Noted: 2021-11-20

## 2021-11-20 PROBLEM — Z95.5 HISTORY OF CORONARY ANGIOPLASTY WITH INSERTION OF STENT: Status: ACTIVE | Noted: 2021-11-20

## 2021-11-20 PROBLEM — Z92.89 HISTORY OF NUCLEAR STRESS TEST: Status: ACTIVE | Noted: 2021-11-20

## 2021-11-20 PROBLEM — Z98.890 HISTORY OF CARDIAC CATHETERIZATION: Status: ACTIVE | Noted: 2021-11-20

## 2021-11-20 NOTE — PROGRESS NOTES
Subjective:      Alicia Upton is in the office today for cardiac evaluation. He is a 40-year-old man that was hospitalized in 2020 and again  in 05/2021. His most recent hospitalization was for acute pancreatitis. He has a longstanding history of alcohol dependence, although at the time of his recent admission he had been sober for the prior 7 months. He had  elevated serum troponins with a normal CPK and MB. Of his prior cardiac history from 2020 as detailed below. Suffice it to say the patient was admitted and subsequently had catheter intervention in the mid LAD. In the office today, the patient reports he has changed his lifestyle. He has been working and staying busy as a  in [employer]. He has not been drinking alcohol recently. He has had no chest pain. He has had no shortness of breath. He does have occasional palpitations. Patient's cardiac risk factors are smoking/ tobacco exposure, hypertension.         Patient Active Problem List    Diagnosis Date Noted    Primary hypertension 11/20/2021    History of echocardiogram 11/20/2021    History of nuclear stress test 11/20/2021    History of cardiac catheterization 11/20/2021    Leukocytosis 11/15/2020    Hypertensive crisis 08/27/2020    Alcohol withdrawal syndrome ([address] [address] 08/02/2020    Intractable nausea and vomiting 08/02/2020    Intractable epigastric abdominal pain 08/02/2020    Delirium tremens ([address] 64/23/4223    Alcoholic gastritis 19/03/8707    Abdominal pain 07/21/2020    Nausea and vomiting 07/21/2020    Enteritis 06/13/2020    Precordial chest pain 03/23/2020    S/P coronary artery stent placement 03/20/2020    Hypokalemia 03/05/2020    Acute alcoholic pancreatitis 58/68/5165    Alcohol withdrawal ([facility] [address] 02/04/2020    Lactic acidosis 02/04/2020    Tachycardia 02/04/2020    Nausea & vomiting 02/04/2020    Depression 06/06/2013    PTSD (post-traumatic stress disorder) 06/06/2013    Polysubstance dependence ([address] 06/06/2013     Current Outpatient Medications   Medication Sig Dispense Refill    dextroamphetamine-amphetamine (AdderalL) 20 mg tablet Take 20 mg by mouth three (3) times daily.  famotidine (Pepcid AC) 20 mg tablet Take 20 mg by mouth daily.  aspirin delayed-release 81 mg tablet Take 1 Tablet by mouth daily. 90 Tablet 3    omeprazole (PRILOSEC) 40 mg capsule Take 40 mg by mouth daily.  rosuvastatin (CRESTOR) 20 mg tablet Take 1 Tablet by mouth nightly. 90 Tablet 5    Blood Pressure Test Kit-Large kit Check blood pressure 3-4 X/week (Patient not taking: Reported on 11/12/2021) 1 Kit 0    amLODIPine (NORVASC) 10 mg tablet Take 1 Tablet by mouth daily. (Patient not taking: Reported on 11/12/2021) 30 Tablet 3    fenofibrate nanocrystallized (TRICOR) 145 mg tablet Take 1 Tablet by mouth daily. (Patient not taking: Reported on 11/12/2021) 90 Tablet 1    metFORMIN (GLUCOPHAGE) 500 mg tablet Take 1 tab with dinner daily X 1 week, then increase to 1 tab with breakfast and dinner daily. (Patient not taking: Reported on 11/12/2021) 180 Tablet 1    cariprazine (Vraylar) 4.5 mg capsule Take  by mouth daily. (Patient not taking: Reported on 11/12/2021)      dextromethorphan-quiNIDine (Nuedexta) 20-10 mg per capsule Take 1 Capsule by mouth daily. (Patient not taking: Reported on 11/12/2021)      rosuvastatin (CRESTOR) 10 mg tablet Take 1 Tablet by mouth nightly. (Patient not taking: Reported on 11/12/2021) 90 Tablet 1    metoprolol succinate (TOPROL-XL) 50 mg XL tablet Take 50 mg by mouth daily. (Patient not taking: Reported on 9/1/2021)      losartan-hydroCHLOROthiazide (HYZAAR) 50-12.5 mg per tablet Take 1 Tab by mouth daily. (Patient not taking: Reported on 9/1/2021)      QUEtiapine (SEROqueL) 300 mg tablet Take 300 mg by mouth daily. (Patient not taking: Reported on 9/1/2021)      naltrexone (DEPADE) 50 mg tablet Take 50 mg by mouth daily.  (Patient not taking: Reported on 9/1/2021)      lithium carbonate CR (ESKALITH CR) 450 mg CR tablet Take 450 mg by mouth two (2) times a day. (Patient not taking: Reported on 9/1/2021)      ergocalciferol (ERGOCALCIFEROL) 1,250 mcg (50,000 unit) capsule Take 1 Cap by mouth every seven (7) days. 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    Nausea & vomiting 2/4/2020    Schizophrenia ([facility]     Substance abuse (HCC)     Opiate and ETOH Abuse    Tobacco dependence      Past Surgical History:   Procedure Laterality Date    HX CORONARY STENT PLACEMENT  03/19/2020    MLAD    HX HERNIA REPAIR      HX ORTHOPAEDIC      L wrist    HX ORTHOPAEDIC      pins in right hand     Family History   Adopted: Yes   Family history unknown: Yes     Social History     Tobacco Use   Smoking Status Current Every Day Smoker    Packs/day: 1.00    Years: 20.00    Pack years: 20.00    Types: Cigarettes   Smokeless Tobacco Never Used          Review of Systems, additional:  Constitutional: negative  Eyes: negative  Respiratory: negative  Cardiovascular: positive for palpitations  Gastrointestinal: negative  Musculoskeletal:negative  Neurological: negative  Behvioral/Psych: negative  Endocrine: negative  ENT: negative  Twelve-lead ECG; 7/12/2021. Sinus rhythm.   Nondiagnostic lateral ST and T wave abnormalities  Objective:     Visit Vitals  /86 (BP 1 Location: Right arm, BP Patient Position: Sitting, BP Cuff Size: Adult)   Pulse 91   Temp 98.4 °F (36.9 °C)   Ht 5' 11\" (1.803 m)   Wt 92.1 kg (203 lb)   SpO2 97%   BMI 28.31 kg/m²     General:  alert, cooperative, no distress   Chest Wall: inspection normal - no chest wall deformities or tenderness, respiratory effort normal   Lung: clear to auscultation bilaterally   Heart:  normal rate and regular rhythm, S1 and S2 normal, no murmurs noted, no gallops noted, no JVD   Abdomen: soft, non-tender. Bowel sounds normal. No masses,  no organomegaly   Extremities: extremities normal, atraumatic, no cyanosis or edema Skin: no rashes   Neuro: alert, oriented, normal speech, no focal findings or movement disorder noted         Assessment/Plan:       ICD-10-CM ICD-9-CM    1. S/P coronary artery stent placement  (KAYLEN Holloway) LAD 95% stenosis reduced to 0% with [facility] scientific Synergy drug-eluting stent 2.5 x 16 mm postdilated with 3.0 mm balloon. 3/20/2020. Stable at present. Z95.5 V45.82    2. Encounter to establish care  Z76.89 V65.8 AMB POC EKG ROUTINE W/ 12 LEADS, INTER & REP      AMB SUPPLY ORDER   3. Hypertension, unspecified type , controlled in the office today. Patient requested to acquire a blood pressure cuff. Return in 6 weeks. I10 401.9 AMB SUPPLY ORDER   4. History of echocardiogram , 3/18/2020. EF 61%. No obvious wall motion abnormalities. Aortic root dilatation 3.9 cm. No significant valvular pathology. Will repeat echocardiogram. Z92.89 V15.89    5. History of nuclear stress test  Z92.89 V15.89    6. History of coronary angioplasty with insertion of stent  Z95.5 V45.82    7 Alcohol dependence, history of, has changed lifestyle with recent alcohol abstinence.

## 2021-11-29 ENCOUNTER — TELEPHONE (OUTPATIENT)
Dept: FAMILY MEDICINE CLINIC | Age: 41
End: 2021-11-29

## 2021-11-29 NOTE — TELEPHONE ENCOUNTER
Abiodun Anthony) - [id number]  amLODIPine Besylate 5MG tablets       Status: Sent to Plan    Created: November 27th, 2021 [phone]    Sent: November 29th, 2021    Waiting on approval from insurance company

## 2021-11-30 NOTE — TELEPHONE ENCOUNTER
[facility] stated they cancelled the Prior Authorization due to it not being needed.

## 2022-06-30 ENCOUNTER — HOSPITAL ENCOUNTER (EMERGENCY)
Age: 42
Discharge: HOME | End: 2022-06-30
Attending: STUDENT IN AN ORGANIZED HEALTH CARE EDUCATION/TRAINING PROGRAM
Payer: MEDICAID

## 2022-06-30 VITALS
DIASTOLIC BLOOD PRESSURE: 76 MMHG | HEIGHT: 71 IN | SYSTOLIC BLOOD PRESSURE: 139 MMHG | WEIGHT: 200 LBS | BODY MASS INDEX: 28 KG/M2 | OXYGEN SATURATION: 98 % | TEMPERATURE: 98.7 F | RESPIRATION RATE: 19 BRPM | HEART RATE: 95 BPM

## 2022-06-30 DIAGNOSIS — F19.10 POLYSUBSTANCE ABUSE (HCC): ICD-10-CM

## 2022-06-30 DIAGNOSIS — F10.932 ALCOHOL WITHDRAWAL SYNDROME WITH PERCEPTUAL DISTURBANCE (HCC): Primary | ICD-10-CM

## 2022-06-30 LAB
ALBUMIN SERPL-MCNC: 3.8 G/DL (ref 3.4–5)
ALBUMIN/GLOB SERPL: 1.1 {RATIO} (ref 0.8–1.7)
ALP SERPL-CCNC: 80 U/L (ref 45–117)
ALT SERPL-CCNC: 25 U/L (ref 16–61)
AMPHET UR QL SCN: NEGATIVE
ANION GAP SERPL CALC-SCNC: 8 MMOL/L (ref 3–18)
AST SERPL-CCNC: 18 U/L (ref 10–38)
BARBITURATES UR QL SCN: NEGATIVE
BASOPHILS # BLD: 0.2 K/UL (ref 0–0.1)
BASOPHILS NFR BLD: 1 % (ref 0–2)
BENZODIAZ UR QL: POSITIVE
BILIRUB SERPL-MCNC: 0.2 MG/DL (ref 0.2–1)
BUN SERPL-MCNC: 6 MG/DL (ref 7–18)
BUN/CREAT SERPL: 7 (ref 12–20)
CALCIUM SERPL-MCNC: 9.2 MG/DL (ref 8.5–10.1)
CANNABINOIDS UR QL SCN: POSITIVE
CHLORIDE SERPL-SCNC: 108 MMOL/L (ref 100–111)
CO2 SERPL-SCNC: 25 MMOL/L (ref 21–32)
COCAINE UR QL SCN: NEGATIVE
CREAT SERPL-MCNC: 0.82 MG/DL (ref 0.6–1.3)
DIFFERENTIAL METHOD BLD: ABNORMAL
EOSINOPHIL # BLD: 0.3 K/UL (ref 0–0.4)
EOSINOPHIL NFR BLD: 2 % (ref 0–5)
ERYTHROCYTE [DISTWIDTH] IN BLOOD BY AUTOMATED COUNT: 13 % (ref 11.6–14.5)
ETHANOL SERPL-MCNC: <3 MG/DL (ref 0–3)
GLOBULIN SER CALC-MCNC: 3.4 G/DL (ref 2–4)
GLUCOSE SERPL-MCNC: 121 MG/DL (ref 74–99)
HCT VFR BLD AUTO: 45.7 % (ref 36–48)
HDSCOM,HDSCOM: ABNORMAL
HGB BLD-MCNC: 15.8 G/DL (ref 13–16)
IMM GRANULOCYTES # BLD AUTO: 0.3 K/UL (ref 0–0.04)
IMM GRANULOCYTES NFR BLD AUTO: 2 % (ref 0–0.5)
LYMPHOCYTES # BLD: 3.5 K/UL (ref 0.9–3.6)
LYMPHOCYTES NFR BLD: 22 % (ref 21–52)
MAGNESIUM SERPL-MCNC: 2.2 MG/DL (ref 1.6–2.6)
MCH RBC QN AUTO: 28.4 PG (ref 24–34)
MCHC RBC AUTO-ENTMCNC: 34.6 G/DL (ref 31–37)
MCV RBC AUTO: 82.2 FL (ref 78–100)
METHADONE UR QL: NEGATIVE
MONOCYTES # BLD: 1 K/UL (ref 0.05–1.2)
MONOCYTES NFR BLD: 7 % (ref 3–10)
NEUTS SEG # BLD: 10.5 K/UL (ref 1.8–8)
NEUTS SEG NFR BLD: 66 % (ref 40–73)
NRBC # BLD: 0 K/UL (ref 0–0.01)
NRBC BLD-RTO: 0 PER 100 WBC
OPIATES UR QL: NEGATIVE
PCP UR QL: NEGATIVE
PLATELET # BLD AUTO: 353 K/UL (ref 135–420)
PMV BLD AUTO: 9.5 FL (ref 9.2–11.8)
POTASSIUM SERPL-SCNC: 3.1 MMOL/L (ref 3.5–5.5)
PROT SERPL-MCNC: 7.2 G/DL (ref 6.4–8.2)
RBC # BLD AUTO: 5.56 M/UL (ref 4.35–5.65)
SODIUM SERPL-SCNC: 141 MMOL/L (ref 136–145)
WBC # BLD AUTO: 15.8 K/UL (ref 4.6–13.2)

## 2022-06-30 PROCEDURE — 83735 ASSAY OF MAGNESIUM: CPT

## 2022-06-30 PROCEDURE — 74011250636 HC RX REV CODE- 250/636: Performed by: EMERGENCY MEDICINE

## 2022-06-30 PROCEDURE — 96375 TX/PRO/DX INJ NEW DRUG ADDON: CPT

## 2022-06-30 PROCEDURE — 74011000250 HC RX REV CODE- 250: Performed by: EMERGENCY MEDICINE

## 2022-06-30 PROCEDURE — 82077 ASSAY SPEC XCP UR&BREATH IA: CPT

## 2022-06-30 PROCEDURE — 85025 COMPLETE CBC W/AUTO DIFF WBC: CPT

## 2022-06-30 PROCEDURE — 96365 THER/PROPH/DIAG IV INF INIT: CPT

## 2022-06-30 PROCEDURE — 80307 DRUG TEST PRSMV CHEM ANLYZR: CPT

## 2022-06-30 PROCEDURE — 96376 TX/PRO/DX INJ SAME DRUG ADON: CPT

## 2022-06-30 PROCEDURE — 93005 ELECTROCARDIOGRAM TRACING: CPT

## 2022-06-30 PROCEDURE — 99284 EMERGENCY DEPT VISIT MOD MDM: CPT

## 2022-06-30 PROCEDURE — 80053 COMPREHEN METABOLIC PANEL: CPT

## 2022-06-30 RX ORDER — LORAZEPAM 2 MG/ML
1 INJECTION, SOLUTION INTRAMUSCULAR; INTRAVENOUS
Status: COMPLETED | OUTPATIENT
Start: 2022-06-30 | End: 2022-06-30

## 2022-06-30 RX ORDER — ONDANSETRON 2 MG/ML
4 INJECTION INTRAMUSCULAR; INTRAVENOUS
Status: COMPLETED | OUTPATIENT
Start: 2022-06-30 | End: 2022-06-30

## 2022-06-30 RX ADMIN — THIAMINE HYDROCHLORIDE: 100 INJECTION, SOLUTION INTRAMUSCULAR; INTRAVENOUS at 20:51

## 2022-06-30 RX ADMIN — ONDANSETRON 4 MG: 2 INJECTION INTRAMUSCULAR; INTRAVENOUS at 18:26

## 2022-06-30 RX ADMIN — LORAZEPAM 1 MG: 2 INJECTION, SOLUTION INTRAMUSCULAR; INTRAVENOUS at 18:26

## 2022-06-30 RX ADMIN — LORAZEPAM 1 MG: 2 INJECTION, SOLUTION INTRAMUSCULAR; INTRAVENOUS at 21:29

## 2022-06-30 NOTE — ED PROVIDER NOTES
EMERGENCY DEPARTMENT HISTORY AND PHYSICAL EXAM    Date: 6/30/2022  Patient Name: Fabienne Hayden    History of Presenting Illness     Chief Complaint   Patient presents with    Withdrawal         History Provided By: Patient    Additional History (Context): Fabienne Hayden is a 35 y.o. male with alcohol and polysubstance abuse; liver disease, schizophrenia; GERD; depression, diabetes who presents with withdrawal from ETOH, benzos, and drugs. Biggest thing for him is ETOH withdrawal.  Typically drinks 1/2 gallon of vodka or whiskey. Last drink Monday night; c/o dizziness, tremors, nausea, diarrhea. Denies abd pain. Takes valium 3-4 tabs at once 3-4x daily. Takes 8mg of morphine 3-4x daily as well. Last valium and morphine use was Sunday. PCP: Kyrie Bianchi NP    Current Outpatient Medications   Medication Sig Dispense Refill    rosuvastatin (CRESTOR) 20 mg tablet Take 1 Tablet by mouth nightly. 90 Tablet 5    dextroamphetamine-amphetamine (AdderalL) 20 mg tablet Take 20 mg by mouth three (3) times daily.  Blood Pressure Test Kit-Large kit Check blood pressure 3-4 X/week (Patient not taking: Reported on 11/12/2021) 1 Kit 0    amLODIPine (NORVASC) 10 mg tablet Take 1 Tablet by mouth daily. (Patient not taking: Reported on 11/12/2021) 30 Tablet 3    fenofibrate nanocrystallized (TRICOR) 145 mg tablet Take 1 Tablet by mouth daily. (Patient not taking: Reported on 11/12/2021) 90 Tablet 1    metFORMIN (GLUCOPHAGE) 500 mg tablet Take 1 tab with dinner daily X 1 week, then increase to 1 tab with breakfast and dinner daily. (Patient not taking: Reported on 11/12/2021) 180 Tablet 1    cariprazine (Vraylar) 4.5 mg capsule Take  by mouth daily. (Patient not taking: Reported on 11/12/2021)      famotidine (Pepcid AC) 20 mg tablet Take 20 mg by mouth daily.  dextromethorphan-quiNIDine (Nuedexta) 20-10 mg per capsule Take 1 Capsule by mouth daily.  (Patient not taking: Reported on 11/12/2021)      aspirin delayed-release 81 mg tablet Take 1 Tablet by mouth daily. 90 Tablet 3    rosuvastatin (CRESTOR) 10 mg tablet Take 1 Tablet by mouth nightly. (Patient not taking: Reported on 11/12/2021) 90 Tablet 1    metoprolol succinate (TOPROL-XL) 50 mg XL tablet Take 50 mg by mouth daily. (Patient not taking: Reported on 9/1/2021)      losartan-hydroCHLOROthiazide (HYZAAR) 50-12.5 mg per tablet Take 1 Tab by mouth daily. (Patient not taking: Reported on 9/1/2021)      QUEtiapine (SEROqueL) 300 mg tablet Take 300 mg by mouth daily. (Patient not taking: Reported on 9/1/2021)      naltrexone (DEPADE) 50 mg tablet Take 50 mg by mouth daily. (Patient not taking: Reported on 9/1/2021)      lithium carbonate CR (ESKALITH CR) 450 mg CR tablet Take 450 mg by mouth two (2) times a day. (Patient not taking: Reported on 9/1/2021)      omeprazole (PRILOSEC) 40 mg capsule Take 40 mg by mouth daily.  ergocalciferol (ERGOCALCIFEROL) 1,250 mcg (50,000 unit) capsule Take 1 Cap by mouth every seven (7) days.  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    Nausea & vomiting 2/4/2020    Schizophrenia ([address]     Substance abuse ([facility] [address]     Opiate and ETOH Abuse    Tobacco dependence        Past Surgical History:  Past Surgical History:   Procedure Laterality Date    HX CORONARY STENT PLACEMENT  03/19/2020    MLAD    HX HERNIA REPAIR      HX ORTHOPAEDIC      L wrist    HX ORTHOPAEDIC      pins in right hand       Family History:  Family History   Adopted: Yes   Family history unknown: Yes       Social History:  Social History     Tobacco Use    Smoking status: Current Every Day Smoker     Packs/day: 1.00     Years: 20.00     Pack years: 20.00     Types: Cigarettes    Smokeless tobacco: Never Used   Vaping Use    Vaping Use: Never used   Substance Use Topics    Alcohol use: Yes     Comment: 3 to 4 clubtails     Drug use: Yes     Frequency: 5.0 times per week     Types: Marijuana, Prescription     Comment: adderol on occasion       Allergies: Allergies   Allergen Reactions    Phenergan [Promethazine] Other (comments)     Agitation    Risperidone Other (comments)     seizures    Zyprexa [Olanzapine] Other (comments)     Headaches         Review of Systems   Review of Systems   Constitutional: Negative for fever. HENT: Negative. Eyes: Positive for visual disturbance. Respiratory: Negative. Negative for shortness of breath. Cardiovascular: Positive for palpitations. Gastrointestinal: Positive for diarrhea and nausea. Endocrine: Negative. Genitourinary: Negative. Musculoskeletal: Positive for myalgias. Skin: Negative. Allergic/Immunologic: Negative. Neurological: Positive for tremors and headaches. Psychiatric/Behavioral: Negative for suicidal ideas. All Other Systems Negative  Physical Exam     Vitals:    06/30/22 1913 06/30/22 1928 06/30/22 1956 06/30/22 2331   BP: (!) 140/92  (!) 147/104 139/76   Pulse:    95   Resp:    19   Temp:   98.7 °F (37.1 °C)    SpO2: 100% 99% 99% 98%   Weight:       Height:         Physical Exam  Vitals and nursing note reviewed. Constitutional:       General: He is not in acute distress. Appearance: He is well-developed. He is ill-appearing and diaphoretic. He is not toxic-appearing. HENT:      Head: Normocephalic and atraumatic. Neck:      Thyroid: No thyromegaly. Vascular: No carotid bruit. Trachea: No tracheal deviation. Cardiovascular:      Rate and Rhythm: Normal rate and regular rhythm. Heart sounds: Normal heart sounds. No murmur heard. No friction rub.  No gallop. Pulmonary:      Effort: Pulmonary effort is normal. No respiratory distress. Breath sounds: Normal breath sounds. No stridor. No wheezing or rales. Chest:      Chest wall: No tenderness. Abdominal:      General: There is no distension. Palpations: Abdomen is soft. There is no mass. Tenderness: There is no abdominal tenderness. There is no guarding or rebound. Musculoskeletal:         General: Normal range of motion. Cervical back: Normal range of motion and neck supple. Skin:     General: Skin is warm. Coloration: Skin is not pale. Neurological:      Mental Status: He is alert and oriented to person, place, and time. Deep Tendon Reflexes: Reflexes abnormal.   Psychiatric:         Speech: Speech normal.         Behavior: Behavior normal.         Thought Content: Thought content normal.         Judgment: Judgment normal.          Diagnostic Study Results     Labs -   No results found for this or any previous visit (from the past 12 hour(s)). Radiologic Studies -   No orders to display     CT Results  (Last 48 hours)    None        CXR Results  (Last 48 hours)    None            Medical Decision Making   I am the first provider for this patient. I reviewed the vital signs, available nursing notes, past medical history, past surgical history, family history and social history. Vital Signs-Reviewed the patient's vital signs. Records Reviewed: Nursing Notes    Procedures:  Procedures    Provider Notes (Medical Decision Making): pt with h/o ETOH, benzo, morphine tab abuse. UDS negative as hasn't used morphine since 6/26. CIWA of 15. Giving IVF, banana bag, Ativan. Needs detox. Calling [place] for transfer as facility has inpatient detox available. Per [address], pt does not meet inpatient criteria as his last drink was over 48hrs and acutely not in need of medical detox.   Spoke with  MED RECONCILIATION:  No current facility-administered medications for this encounter. Current Outpatient Medications   Medication Sig    rosuvastatin (CRESTOR) 20 mg tablet Take 1 Tablet by mouth nightly.  dextroamphetamine-amphetamine (AdderalL) 20 mg tablet Take 20 mg by mouth three (3) times daily.  Blood Pressure Test Kit-Large kit Check blood pressure 3-4 X/week (Patient not taking: Reported on 11/12/2021)    amLODIPine (NORVASC) 10 mg tablet Take 1 Tablet by mouth daily. (Patient not taking: Reported on 11/12/2021)    fenofibrate nanocrystallized (TRICOR) 145 mg tablet Take 1 Tablet by mouth daily. (Patient not taking: Reported on 11/12/2021)    metFORMIN (GLUCOPHAGE) 500 mg tablet Take 1 tab with dinner daily X 1 week, then increase to 1 tab with breakfast and dinner daily. (Patient not taking: Reported on 11/12/2021)    cariprazine (Vraylar) 4.5 mg capsule Take  by mouth daily. (Patient not taking: Reported on 11/12/2021)    famotidine (Pepcid AC) 20 mg tablet Take 20 mg by mouth daily.  dextromethorphan-quiNIDine (Nuedexta) 20-10 mg per capsule Take 1 Capsule by mouth daily. (Patient not taking: Reported on 11/12/2021)    aspirin delayed-release 81 mg tablet Take 1 Tablet by mouth daily.  rosuvastatin (CRESTOR) 10 mg tablet Take 1 Tablet by mouth nightly. (Patient not taking: Reported on 11/12/2021)    metoprolol succinate (TOPROL-XL) 50 mg XL tablet Take 50 mg by mouth daily. (Patient not taking: Reported on 9/1/2021)    losartan-hydroCHLOROthiazide (HYZAAR) 50-12.5 mg per tablet Take 1 Tab by mouth daily. (Patient not taking: Reported on 9/1/2021)    QUEtiapine (SEROqueL) 300 mg tablet Take 300 mg by mouth daily. (Patient not taking: Reported on 9/1/2021)    naltrexone (DEPADE) 50 mg tablet Take 50 mg by mouth daily. (Patient not taking: Reported on 9/1/2021)    lithium carbonate CR (ESKALITH CR) 450 mg CR tablet Take 450 mg by mouth two (2) times a day.  (Patient not taking: Reported on 9/1/2021)    omeprazole (PRILOSEC) 40 mg capsule Take 40 mg by mouth daily.  ergocalciferol (ERGOCALCIFEROL) 1,250 mcg (50,000 unit) capsule Take 1 Cap by mouth every seven (7) days. (Patient not taking: Reported on 9/1/2021)       Disposition:  discharge    DISCHARGE NOTE:     Pt has been reexamined. Patient has no new complaints, changes, or physical findings. Care plan outlined and precautions discussed. Results of labs were reviewed with the patient. All medications were reviewed with the patient. All of pt's questions and concerns were addressed. Patient was instructed and agrees to follow up with PCP, [facility], as well as to return to the ED upon further deterioration. Patient is ready to go home. Follow-up Information     Follow up With Specialties Details Why Contact Info    Gabby Sun NP Family Nurse Practitioner, Nurse Practitioner Schedule an appointment as soon as possible for a visit in 1 day  [address]  Schedule an appointment as soon as possible for a visit in 1 day  [address] [place] [address]  [phone]    [facility] EMERGENCY DEPT Emergency Medicine  If symptoms worsen return immediately [address] [facility] [address]  [phone]          Discharge Medication List as of 6/30/2022 11:20 PM            Diagnosis     Clinical Impression:   1. Alcohol withdrawal syndrome with perceptual disturbance ([facility] [address]    2.  Polysubstance abuse ([facility] [address]

## 2022-06-30 NOTE — ED TRIAGE NOTES
Client from [facility], reports feeling on impending seizue, detox from etoh, opiates, and benzo's. Client reports N/V, shakes, diaphoresis an extreme HA. AXOX4. NAD. Admitted at facility  On Tuesday.

## 2022-07-01 LAB
ATRIAL RATE: 99 BPM
CALCULATED P AXIS, ECG09: 58 DEGREES
CALCULATED R AXIS, ECG10: 22 DEGREES
CALCULATED T AXIS, ECG11: 75 DEGREES
DIAGNOSIS, 93000: NORMAL
P-R INTERVAL, ECG05: 184 MS
Q-T INTERVAL, ECG07: 352 MS
QRS DURATION, ECG06: 90 MS
QTC CALCULATION (BEZET), ECG08: 451 MS
VENTRICULAR RATE, ECG03: 99 BPM

## 2022-07-01 NOTE — ED NOTES
Assumed care of pt. Pt lying on bed and resting. Respirations are even and unlabored. Denies any pain. No tremors noted at this time but feels anxious. Will continue to monitor.

## 2022-07-01 NOTE — ED NOTES
Pt requesting more ativan and feeling anxious. Provider aware. Pt given another dose of ativan. No distress noted.

## 2025-05-09 NOTE — ED NOTES
History: Patient was signed out to  me this signout occurred at 6 AM   by Dr. Jono Piper. patient resting comfortably in the emergency department awaiting psychiatric placement. Patient was requesting evaluation for her is alcohol use and detoxification    Patient is awaiting case management    Vitals:  No data found. Medications ordered:   Medications - No data to display      Progress notes, Consult notes or Re-evaluation:   I was made aware the patient did not want to wait anymore and wanted to leave. I had discussed this Dr. Jono Piper as he was still in the emergency department he did not have any problem with as patient's gait was normal  I did not evaluate this patient myself. Did not have any further input into his patient's care as he was awake alert oriented and the primary physician who has still in the department was okay patient leaving. Diagnostic Study Results     Labs -   No results found for this or any previous visit (from the past 12 hour(s)). Radiologic Studies -   No orders to display     CT Results  (Last 48 hours)    None        CXR Results  (Last 48 hours)    None            Discharge     Clinical Impression:   1. Alcohol use    2.  Abdominal pain, epigastric        Disposition:  Home    Follow-up Information    None
I have reviewed discharge instructions with the patient. The patient verbalized understanding. Patient armband removed and shredded    Patient ambulatory to ED lobby.
Patient refusing medications wants to leave. MD made aware. Patient left without discharge instructions.
Heterosexual